# Patient Record
Sex: MALE | Race: WHITE | NOT HISPANIC OR LATINO
[De-identification: names, ages, dates, MRNs, and addresses within clinical notes are randomized per-mention and may not be internally consistent; named-entity substitution may affect disease eponyms.]

---

## 2021-05-26 PROBLEM — Z00.00 ENCOUNTER FOR PREVENTIVE HEALTH EXAMINATION: Status: ACTIVE | Noted: 2021-05-26

## 2021-05-28 ENCOUNTER — APPOINTMENT (OUTPATIENT)
Dept: UROLOGY | Facility: CLINIC | Age: 86
End: 2021-05-28
Payer: MEDICARE

## 2021-05-28 VITALS
HEART RATE: 101 BPM | DIASTOLIC BLOOD PRESSURE: 75 MMHG | WEIGHT: 165 LBS | TEMPERATURE: 97.6 F | SYSTOLIC BLOOD PRESSURE: 146 MMHG | BODY MASS INDEX: 25.01 KG/M2 | HEIGHT: 68 IN

## 2021-05-28 DIAGNOSIS — Z86.73 PERSONAL HISTORY OF TRANSIENT ISCHEMIC ATTACK (TIA), AND CEREBRAL INFARCTION W/OUT RESIDUAL DEFICITS: ICD-10-CM

## 2021-05-28 DIAGNOSIS — Z86.79 PERSONAL HISTORY OF OTHER DISEASES OF THE CIRCULATORY SYSTEM: ICD-10-CM

## 2021-05-28 DIAGNOSIS — Z78.9 OTHER SPECIFIED HEALTH STATUS: ICD-10-CM

## 2021-05-28 PROCEDURE — 51798 US URINE CAPACITY MEASURE: CPT

## 2021-05-28 PROCEDURE — 99204 OFFICE O/P NEW MOD 45 MIN: CPT

## 2021-05-28 RX ORDER — ENALAPRIL MALEATE 10 MG/1
10 TABLET ORAL
Refills: 0 | Status: ACTIVE | COMMUNITY

## 2021-05-28 RX ORDER — ATORVASTATIN CALCIUM 40 MG/1
40 TABLET, FILM COATED ORAL
Refills: 0 | Status: ACTIVE | COMMUNITY

## 2021-05-28 NOTE — LETTER BODY
[Dear  ___] : Dear  [unfilled], [Consult Letter:] : I had the pleasure of evaluating your patient, [unfilled]. [Please see my note below.] : Please see my note below. [Consult Closing:] : Thank you very much for allowing me to participate in the care of this patient.  If you have any questions, please do not hesitate to contact me. [Sincerely,] : Sincerely, [FreeTextEntry3] : William

## 2021-05-28 NOTE — PHYSICAL EXAM
[General Appearance - Well Developed] : well developed [General Appearance - Well Nourished] : well nourished [Well Groomed] : well groomed [Normal Appearance] : normal appearance [General Appearance - In No Acute Distress] : no acute distress [Edema] : no peripheral edema [Respiration, Rhythm And Depth] : normal respiratory rhythm and effort [Exaggerated Use Of Accessory Muscles For Inspiration] : no accessory muscle use [Abdomen Soft] : soft [Abdomen Tenderness] : non-tender [Costovertebral Angle Tenderness] : no ~M costovertebral angle tenderness [Urethral Meatus] : meatus normal [Penis Abnormality] : normal uncircumcised penis [Urinary Bladder Findings] : the bladder was normal on palpation [Scrotum] : the scrotum was normal [Testes Mass (___cm)] : there were no testicular masses [Prostate Tenderness] : the prostate was not tender [No Prostate Nodules] : no prostate nodules [Prostate Size ___ (0-4)] : prostate size [unfilled] (scale: 0-4) [Normal Station and Gait] : the gait and station were normal for the patient's age [] : no rash [No Focal Deficits] : no focal deficits [Oriented To Time, Place, And Person] : oriented to person, place, and time [Affect] : the affect was normal [Mood] : the mood was normal [Not Anxious] : not anxious [No Palpable Adenopathy] : no palpable adenopathy

## 2021-05-28 NOTE — HISTORY OF PRESENT ILLNESS
[FreeTextEntry1] : This is a 89-year-old  male with a 5-month history of painless, third, gross hematuria.  The patient states he passed to "lori" in January.  He denies dysuria, suprapubic or flank pain or fever.  Patient denies history of kidney stones and does not smoke.  He states that he is voiding with an adequate stream and has nocturia x2.  The patient states he had a renal ultrasound which revealed a "lesion" on one of his kidneys.

## 2021-05-28 NOTE — ASSESSMENT
[FreeTextEntry1] : Thus the patient has several months history of painless gross hematuria and passage of likely small bladder calculi.  Patient on examination has a large prostate and significant postvoid residual (160 mL).  We will send the patient's urine for analysis, culture and cytology and he will have a basic metabolic test.  He will be placed on tamsulosin, 0.4 mg daily, to help with bladder emptying.  The patient will be scheduled for a pre and postcontrast CT scan of the abdomen and pelvis for better assessment of his upper tract and he will come back in 2 weeks for cystoscopic evaluation.

## 2021-05-28 NOTE — REVIEW OF SYSTEMS
[Negative] : Heme/Lymph [Blood in urine that you can see] : blood visible in urine [Wake up at night to urinate  How many times?  ___] : wakes up to urinate [unfilled] times during the night [Incontinence] : incontinence [Nocturia] : nocturia [Urine Infection (bladder/kidney)] : denies bladder/kidney infections [Pain during urination] : denies pain during urination [Pain at onset of urination] : denies pain during onset of urination [Pain after urination] : denies pain after urination [Told you have blood in urine on a urine test] : denies being told that blood was present in a urine test [Discharge from urine canal] : denies discharge from urine canal [History of kidney stones] : denies history of kidney stones [Urine retention] : denies urine retention [Strong urge to urinate] : denies strong urge to urinate [Bladder pressure] : denies bladder pressure [Strain or push to urinate] : denies straining or pushing to urinate [Wait a long time to urinate] : denies waiting a long time to urinate [Slow urine stream] : denies slow urine stream [Interrupted urine stream] : denies interrupted urine stream [Bladder fullness after urinating] : denies bladder fullness after urinating [Increased pain/discomfort with bladder filling] : denies increased pain/discomfort with bladder filling [Bladder problems as child. If yes, describe..] : denies bladder problems as child [Leakage of urine with straining, coughing, laughing] : denies leakage of urine with straining, coughing, and/or laughing [Unaware of when urine is leaking] : aware of when urine is leaking

## 2021-06-02 DIAGNOSIS — Z87.440 PERSONAL HISTORY OF URINARY (TRACT) INFECTIONS: ICD-10-CM

## 2021-06-09 ENCOUNTER — NON-APPOINTMENT (OUTPATIENT)
Age: 86
End: 2021-06-09

## 2021-06-14 ENCOUNTER — APPOINTMENT (OUTPATIENT)
Dept: UROLOGY | Facility: CLINIC | Age: 86
End: 2021-06-14
Payer: MEDICARE

## 2021-06-14 VITALS — TEMPERATURE: 98.1 F | DIASTOLIC BLOOD PRESSURE: 60 MMHG | SYSTOLIC BLOOD PRESSURE: 161 MMHG | HEART RATE: 114 BPM

## 2021-06-14 DIAGNOSIS — R31.0 GROSS HEMATURIA: ICD-10-CM

## 2021-06-14 DIAGNOSIS — D49.4 NEOPLASM OF UNSPECIFIED BEHAVIOR OF BLADDER: ICD-10-CM

## 2021-06-14 PROCEDURE — 52000 CYSTOURETHROSCOPY: CPT

## 2021-06-14 PROCEDURE — 99214 OFFICE O/P EST MOD 30 MIN: CPT | Mod: 25

## 2021-06-14 NOTE — HISTORY OF PRESENT ILLNESS
[FreeTextEntry1] : Patient is describing persistent hematuria, although he states that urine has not been as dark.  He denies dysuria suprapubic or flank pain or fever.  CT scan revealed 3.2 cm exophytic mass in the left kidney and a upper pole right renal mass, likely cyst.  In addition there is a large bladder mass measuring approximately 3.6 cm.. He presents for cystoscopy

## 2021-06-14 NOTE — LETTER BODY
[Dear  ___] : Dear  [unfilled], [Courtesy Letter:] : I had the pleasure of seeing your patient, [unfilled], in my office today. [Please see my note below.] : Please see my note below. [Consult Closing:] : Thank you very much for allowing me to participate in the care of this patient.  If you have any questions, please do not hesitate to contact me. [Sincerely,] : Sincerely, [FreeTextEntry3] : William [DrDell  ___] : Dr. STRAUSS

## 2021-06-14 NOTE — ASSESSMENT
[FreeTextEntry1] : The patient has persistent hematuria secondary to large bladder mass.  He will be seeing his cardiologist today for medical and cardiac evaluation and clearance and we will schedule him for transurethral resection of his bladder tumor in the near future.  We will treat his renal mass conservatively for now.

## 2021-06-14 NOTE — REVIEW OF SYSTEMS
[Incontinence] : incontinence [Nocturia] : nocturia [Blood in urine that you can see] : blood visible in urine [Wake up at night to urinate  How many times?  ___] : wakes up to urinate [unfilled] times during the night [Negative] : Heme/Lymph [Urine Infection (bladder/kidney)] : denies bladder/kidney infections [Pain during urination] : denies pain during urination [Pain at onset of urination] : denies pain during onset of urination [Pain after urination] : denies pain after urination [Told you have blood in urine on a urine test] : denies being told that blood was present in a urine test [Discharge from urine canal] : denies discharge from urine canal [History of kidney stones] : denies history of kidney stones [Urine retention] : denies urine retention [Strong urge to urinate] : denies strong urge to urinate [Bladder pressure] : denies bladder pressure [Strain or push to urinate] : denies straining or pushing to urinate [Wait a long time to urinate] : denies waiting a long time to urinate [Slow urine stream] : denies slow urine stream [Interrupted urine stream] : denies interrupted urine stream [Bladder fullness after urinating] : denies bladder fullness after urinating [Increased pain/discomfort with bladder filling] : denies increased pain/discomfort with bladder filling [Bladder problems as child. If yes, describe..] : denies bladder problems as child [Leakage of urine with straining, coughing, laughing] : denies leakage of urine with straining, coughing, and/or laughing [Unaware of when urine is leaking] : aware of when urine is leaking

## 2021-06-24 ENCOUNTER — INPATIENT (INPATIENT)
Facility: HOSPITAL | Age: 86
LOS: 1 days | Discharge: HOME | DRG: 668 | End: 2021-06-26
Attending: UROLOGY | Admitting: UROLOGY
Payer: MEDICARE

## 2021-06-24 ENCOUNTER — TRANSCRIPTION ENCOUNTER (OUTPATIENT)
Age: 86
End: 2021-06-24

## 2021-06-24 VITALS
HEART RATE: 86 BPM | OXYGEN SATURATION: 95 % | RESPIRATION RATE: 17 BRPM | SYSTOLIC BLOOD PRESSURE: 155 MMHG | DIASTOLIC BLOOD PRESSURE: 76 MMHG | WEIGHT: 179.68 LBS | TEMPERATURE: 98 F

## 2021-06-24 DIAGNOSIS — T41.205A ADVERSE EFFECT OF UNSPECIFIED GENERAL ANESTHETICS, INITIAL ENCOUNTER: ICD-10-CM

## 2021-06-24 DIAGNOSIS — T80.89XA OTHER COMPLICATIONS FOLLOWING INFUSION, TRANSFUSION AND THERAPEUTIC INJECTION, INITIAL ENCOUNTER: ICD-10-CM

## 2021-06-24 DIAGNOSIS — I69.352 HEMIPLEGIA AND HEMIPARESIS FOLLOWING CEREBRAL INFARCTION AFFECTING LEFT DOMINANT SIDE: ICD-10-CM

## 2021-06-24 DIAGNOSIS — Z87.438 PERSONAL HISTORY OF OTHER DISEASES OF MALE GENITAL ORGANS: Chronic | ICD-10-CM

## 2021-06-24 DIAGNOSIS — I10 ESSENTIAL (PRIMARY) HYPERTENSION: Chronic | ICD-10-CM

## 2021-06-24 DIAGNOSIS — Z79.02 LONG TERM (CURRENT) USE OF ANTITHROMBOTICS/ANTIPLATELETS: ICD-10-CM

## 2021-06-24 DIAGNOSIS — I69.359 HEMIPLEGIA AND HEMIPARESIS FOLLOWING CEREBRAL INFARCTION AFFECTING UNSPECIFIED SIDE: Chronic | ICD-10-CM

## 2021-06-24 DIAGNOSIS — D50.0 IRON DEFICIENCY ANEMIA SECONDARY TO BLOOD LOSS (CHRONIC): Chronic | ICD-10-CM

## 2021-06-24 DIAGNOSIS — D49.4 NEOPLASM OF UNSPECIFIED BEHAVIOR OF BLADDER: ICD-10-CM

## 2021-06-24 DIAGNOSIS — G92 TOXIC ENCEPHALOPATHY: ICD-10-CM

## 2021-06-24 LAB
ALBUMIN SERPL ELPH-MCNC: 3.7 G/DL — SIGNIFICANT CHANGE UP (ref 3.3–5)
ALP SERPL-CCNC: 77 U/L — SIGNIFICANT CHANGE UP (ref 40–120)
ALT FLD-CCNC: 12 U/L — SIGNIFICANT CHANGE UP (ref 10–45)
ANION GAP SERPL CALC-SCNC: 11 MMOL/L — SIGNIFICANT CHANGE UP (ref 5–17)
APPEARANCE UR: ABNORMAL
APTT BLD: 28.8 SEC — SIGNIFICANT CHANGE UP (ref 27.5–35.5)
AST SERPL-CCNC: 15 U/L — SIGNIFICANT CHANGE UP (ref 10–40)
BACTERIA # UR AUTO: PRESENT /HPF
BILIRUB SERPL-MCNC: 0.4 MG/DL — SIGNIFICANT CHANGE UP (ref 0.2–1.2)
BILIRUB UR-MCNC: NEGATIVE — SIGNIFICANT CHANGE UP
BLD GP AB SCN SERPL QL: NEGATIVE — SIGNIFICANT CHANGE UP
BLD GP AB SCN SERPL QL: NEGATIVE — SIGNIFICANT CHANGE UP
BUN SERPL-MCNC: 19 MG/DL — SIGNIFICANT CHANGE UP (ref 7–23)
CALCIUM SERPL-MCNC: 9 MG/DL — SIGNIFICANT CHANGE UP (ref 8.4–10.5)
CHLORIDE SERPL-SCNC: 100 MMOL/L — SIGNIFICANT CHANGE UP (ref 96–108)
CO2 SERPL-SCNC: 24 MMOL/L — SIGNIFICANT CHANGE UP (ref 22–31)
COLOR SPEC: YELLOW — SIGNIFICANT CHANGE UP
CREAT SERPL-MCNC: 0.95 MG/DL — SIGNIFICANT CHANGE UP (ref 0.5–1.3)
DIFF PNL FLD: ABNORMAL
EPI CELLS # UR: SIGNIFICANT CHANGE UP /HPF (ref 0–5)
GLUCOSE SERPL-MCNC: 105 MG/DL — HIGH (ref 70–99)
GLUCOSE UR QL: NEGATIVE — SIGNIFICANT CHANGE UP
HCT VFR BLD CALC: 25.3 % — LOW (ref 39–50)
HGB BLD-MCNC: 7.6 G/DL — LOW (ref 13–17)
INR BLD: 1.21 — HIGH (ref 0.88–1.16)
KETONES UR-MCNC: NEGATIVE — SIGNIFICANT CHANGE UP
LEUKOCYTE ESTERASE UR-ACNC: ABNORMAL
MCHC RBC-ENTMCNC: 24.8 PG — LOW (ref 27–34)
MCHC RBC-ENTMCNC: 30 GM/DL — LOW (ref 32–36)
MCV RBC AUTO: 82.4 FL — SIGNIFICANT CHANGE UP (ref 80–100)
NITRITE UR-MCNC: NEGATIVE — SIGNIFICANT CHANGE UP
NRBC # BLD: 0 /100 WBCS — SIGNIFICANT CHANGE UP (ref 0–0)
PH UR: 7.5 — SIGNIFICANT CHANGE UP (ref 5–8)
PLATELET # BLD AUTO: 263 K/UL — SIGNIFICANT CHANGE UP (ref 150–400)
POTASSIUM SERPL-MCNC: 4.1 MMOL/L — SIGNIFICANT CHANGE UP (ref 3.5–5.3)
POTASSIUM SERPL-SCNC: 4.1 MMOL/L — SIGNIFICANT CHANGE UP (ref 3.5–5.3)
PROT SERPL-MCNC: 6.6 G/DL — SIGNIFICANT CHANGE UP (ref 6–8.3)
PROT UR-MCNC: 30 MG/DL
PROTHROM AB SERPL-ACNC: 14.4 SEC — HIGH (ref 10.6–13.6)
RBC # BLD: 3.07 M/UL — LOW (ref 4.2–5.8)
RBC # FLD: 16.6 % — HIGH (ref 10.3–14.5)
RBC CASTS # UR COMP ASSIST: ABNORMAL /HPF
RH IG SCN BLD-IMP: POSITIVE — SIGNIFICANT CHANGE UP
RH IG SCN BLD-IMP: POSITIVE — SIGNIFICANT CHANGE UP
SODIUM SERPL-SCNC: 135 MMOL/L — SIGNIFICANT CHANGE UP (ref 135–145)
SP GR SPEC: 1.01 — SIGNIFICANT CHANGE UP (ref 1–1.03)
UROBILINOGEN FLD QL: 0.2 E.U./DL — SIGNIFICANT CHANGE UP
WBC # BLD: 7.05 K/UL — SIGNIFICANT CHANGE UP (ref 3.8–10.5)
WBC # FLD AUTO: 7.05 K/UL — SIGNIFICANT CHANGE UP (ref 3.8–10.5)
WBC UR QL: < 5 /HPF — SIGNIFICANT CHANGE UP

## 2021-06-24 PROCEDURE — 71045 X-RAY EXAM CHEST 1 VIEW: CPT | Mod: 26

## 2021-06-24 RX ORDER — SODIUM CHLORIDE 9 MG/ML
1000 INJECTION INTRAMUSCULAR; INTRAVENOUS; SUBCUTANEOUS
Refills: 0 | Status: DISCONTINUED | OUTPATIENT
Start: 2021-06-24 | End: 2021-06-24

## 2021-06-24 RX ORDER — ACETAMINOPHEN 500 MG
650 TABLET ORAL EVERY 6 HOURS
Refills: 0 | Status: DISCONTINUED | OUTPATIENT
Start: 2021-06-24 | End: 2021-06-25

## 2021-06-24 RX ORDER — AMLODIPINE BESYLATE 2.5 MG/1
5 TABLET ORAL DAILY
Refills: 0 | Status: DISCONTINUED | OUTPATIENT
Start: 2021-06-24 | End: 2021-06-26

## 2021-06-24 RX ORDER — TAMSULOSIN HYDROCHLORIDE 0.4 MG/1
0.4 CAPSULE ORAL AT BEDTIME
Refills: 0 | Status: DISCONTINUED | OUTPATIENT
Start: 2021-06-24 | End: 2021-06-26

## 2021-06-24 RX ORDER — ATORVASTATIN CALCIUM 80 MG/1
20 TABLET, FILM COATED ORAL AT BEDTIME
Refills: 0 | Status: DISCONTINUED | OUTPATIENT
Start: 2021-06-24 | End: 2021-06-26

## 2021-06-24 RX ADMIN — TAMSULOSIN HYDROCHLORIDE 0.4 MILLIGRAM(S): 0.4 CAPSULE ORAL at 21:43

## 2021-06-24 RX ADMIN — ATORVASTATIN CALCIUM 20 MILLIGRAM(S): 80 TABLET, FILM COATED ORAL at 21:43

## 2021-06-24 NOTE — H&P ADULT - NSHPLABSRESULTS_GEN_ALL_CORE
7.6    7.05  )-----------( 263      ( 24 Jun 2021 14:06 )             25.3   BMP pending  u/a pending

## 2021-06-24 NOTE — H&P ADULT - HISTORY OF PRESENT ILLNESS
89 yo male c/o hematuria on and off for 2 months. Patient had outpt cystoscopy which shows bladder tumor. Patient scheduled for TURBT tomorrow. +generalized weakness. No fever/chills. No abdominal pain. No N/V. No CP/SOB. Patient has been off his plavix about 2 weeks now.

## 2021-06-24 NOTE — H&P ADULT - NSHPPHYSICALEXAM_GEN_ALL_CORE
Gen: alert and awake  Abd: soft, NT, ND  : no SP discomfort. Uncircumcised phallus, bilat testes descended, NT,   EXT: no edema or calf tenderness

## 2021-06-24 NOTE — H&P ADULT - PROBLEM SELECTOR PLAN 1
-admit  -transfuse PRBC's  -f/u ekg and cxr  -f/u u/a  -NPO P MN for TURBT in am  -continue present care  -continue to hold plavix

## 2021-06-24 NOTE — H&P ADULT - NSICDXPASTSURGICALHX_GEN_ALL_CORE_FT
Detail Level: Detailed
PAST SURGICAL HISTORY:  Anemia due to chronic blood loss     Hemiplegia, dominant side S/P CVA (cerebrovascular accident)     History of BPH     HTN (hypertension)

## 2021-06-25 ENCOUNTER — APPOINTMENT (OUTPATIENT)
Dept: UROLOGY | Facility: HOSPITAL | Age: 86
End: 2021-06-25

## 2021-06-25 ENCOUNTER — RESULT REVIEW (OUTPATIENT)
Age: 86
End: 2021-06-25

## 2021-06-25 LAB
ANION GAP SERPL CALC-SCNC: 12 MMOL/L — SIGNIFICANT CHANGE UP (ref 5–17)
APPEARANCE UR: ABNORMAL
BACTERIA # UR AUTO: PRESENT /HPF
BASOPHILS # BLD AUTO: 0.06 K/UL — SIGNIFICANT CHANGE UP (ref 0–0.2)
BASOPHILS NFR BLD AUTO: 0.5 % — SIGNIFICANT CHANGE UP (ref 0–2)
BILIRUB UR-MCNC: NEGATIVE — SIGNIFICANT CHANGE UP
BUN SERPL-MCNC: 23 MG/DL — SIGNIFICANT CHANGE UP (ref 7–23)
CALCIUM SERPL-MCNC: 8.4 MG/DL — SIGNIFICANT CHANGE UP (ref 8.4–10.5)
CHLORIDE SERPL-SCNC: 103 MMOL/L — SIGNIFICANT CHANGE UP (ref 96–108)
CO2 SERPL-SCNC: 21 MMOL/L — LOW (ref 22–31)
COLOR SPEC: SIGNIFICANT CHANGE UP
CREAT SERPL-MCNC: 0.95 MG/DL — SIGNIFICANT CHANGE UP (ref 0.5–1.3)
DIFF PNL FLD: ABNORMAL
EOSINOPHIL # BLD AUTO: 0.04 K/UL — SIGNIFICANT CHANGE UP (ref 0–0.5)
EOSINOPHIL NFR BLD AUTO: 0.3 % — SIGNIFICANT CHANGE UP (ref 0–6)
EPI CELLS # UR: SIGNIFICANT CHANGE UP /HPF (ref 0–5)
GLUCOSE SERPL-MCNC: 173 MG/DL — HIGH (ref 70–99)
GLUCOSE UR QL: NEGATIVE — SIGNIFICANT CHANGE UP
HCT VFR BLD CALC: 30.9 % — LOW (ref 39–50)
HGB BLD-MCNC: 9.4 G/DL — LOW (ref 13–17)
IMM GRANULOCYTES NFR BLD AUTO: 0.9 % — SIGNIFICANT CHANGE UP (ref 0–1.5)
KETONES UR-MCNC: NEGATIVE — SIGNIFICANT CHANGE UP
LEUKOCYTE ESTERASE UR-ACNC: NEGATIVE — SIGNIFICANT CHANGE UP
LYMPHOCYTES # BLD AUTO: 0.52 K/UL — LOW (ref 1–3.3)
LYMPHOCYTES # BLD AUTO: 4.3 % — LOW (ref 13–44)
MCHC RBC-ENTMCNC: 25.7 PG — LOW (ref 27–34)
MCHC RBC-ENTMCNC: 30.4 GM/DL — LOW (ref 32–36)
MCV RBC AUTO: 84.4 FL — SIGNIFICANT CHANGE UP (ref 80–100)
MONOCYTES # BLD AUTO: 0.49 K/UL — SIGNIFICANT CHANGE UP (ref 0–0.9)
MONOCYTES NFR BLD AUTO: 4 % — SIGNIFICANT CHANGE UP (ref 2–14)
NEUTROPHILS # BLD AUTO: 10.93 K/UL — HIGH (ref 1.8–7.4)
NEUTROPHILS NFR BLD AUTO: 90 % — HIGH (ref 43–77)
NITRITE UR-MCNC: NEGATIVE — SIGNIFICANT CHANGE UP
NRBC # BLD: 0 /100 WBCS — SIGNIFICANT CHANGE UP (ref 0–0)
NT-PROBNP SERPL-SCNC: 566 PG/ML — HIGH (ref 0–300)
PH UR: 8 — SIGNIFICANT CHANGE UP (ref 5–8)
PLATELET # BLD AUTO: 234 K/UL — SIGNIFICANT CHANGE UP (ref 150–400)
POTASSIUM SERPL-MCNC: 4.1 MMOL/L — SIGNIFICANT CHANGE UP (ref 3.5–5.3)
POTASSIUM SERPL-SCNC: 4.1 MMOL/L — SIGNIFICANT CHANGE UP (ref 3.5–5.3)
PROT UR-MCNC: 30 MG/DL
RBC # BLD: 3.66 M/UL — LOW (ref 4.2–5.8)
RBC # FLD: 16.1 % — HIGH (ref 10.3–14.5)
RBC CASTS # UR COMP ASSIST: > 10 /HPF
SODIUM SERPL-SCNC: 136 MMOL/L — SIGNIFICANT CHANGE UP (ref 135–145)
SP GR SPEC: 1.01 — SIGNIFICANT CHANGE UP (ref 1–1.03)
TRI-PHOS CRY UR QL COMP ASSIST: ABNORMAL /HPF
TROPONIN T SERPL-MCNC: 0.01 NG/ML — SIGNIFICANT CHANGE UP (ref 0–0.01)
UROBILINOGEN FLD QL: 0.2 E.U./DL — SIGNIFICANT CHANGE UP
WBC # BLD: 12.15 K/UL — HIGH (ref 3.8–10.5)
WBC # FLD AUTO: 12.15 K/UL — HIGH (ref 3.8–10.5)
WBC UR QL: < 5 /HPF — SIGNIFICANT CHANGE UP

## 2021-06-25 PROCEDURE — 99223 1ST HOSP IP/OBS HIGH 75: CPT

## 2021-06-25 PROCEDURE — 71045 X-RAY EXAM CHEST 1 VIEW: CPT | Mod: 26

## 2021-06-25 PROCEDURE — 52240 CYSTOSCOPY AND TREATMENT: CPT

## 2021-06-25 PROCEDURE — 88307 TISSUE EXAM BY PATHOLOGIST: CPT | Mod: 26

## 2021-06-25 RX ORDER — ONDANSETRON 8 MG/1
4 TABLET, FILM COATED ORAL EVERY 8 HOURS
Refills: 0 | Status: DISCONTINUED | OUTPATIENT
Start: 2021-06-25 | End: 2021-06-26

## 2021-06-25 RX ORDER — KETOROLAC TROMETHAMINE 30 MG/ML
15 SYRINGE (ML) INJECTION EVERY 8 HOURS
Refills: 0 | Status: DISCONTINUED | OUTPATIENT
Start: 2021-06-25 | End: 2021-06-26

## 2021-06-25 RX ORDER — OXYCODONE HYDROCHLORIDE 5 MG/1
10 TABLET ORAL ONCE
Refills: 0 | Status: DISCONTINUED | OUTPATIENT
Start: 2021-06-25 | End: 2021-06-25

## 2021-06-25 RX ORDER — FUROSEMIDE 40 MG
20 TABLET ORAL ONCE
Refills: 0 | Status: COMPLETED | OUTPATIENT
Start: 2021-06-25 | End: 2021-06-25

## 2021-06-25 RX ORDER — CEFAZOLIN SODIUM 1 G
500 VIAL (EA) INJECTION EVERY 8 HOURS
Refills: 0 | Status: DISCONTINUED | OUTPATIENT
Start: 2021-06-25 | End: 2021-06-25

## 2021-06-25 RX ORDER — ACETAMINOPHEN 500 MG
650 TABLET ORAL EVERY 6 HOURS
Refills: 0 | Status: DISCONTINUED | OUTPATIENT
Start: 2021-06-25 | End: 2021-06-26

## 2021-06-25 RX ORDER — SODIUM CHLORIDE 9 MG/ML
1000 INJECTION INTRAMUSCULAR; INTRAVENOUS; SUBCUTANEOUS
Refills: 0 | Status: DISCONTINUED | OUTPATIENT
Start: 2021-06-25 | End: 2021-06-25

## 2021-06-25 RX ORDER — CEFAZOLIN SODIUM 1 G
1000 VIAL (EA) INJECTION EVERY 8 HOURS
Refills: 0 | Status: COMPLETED | OUTPATIENT
Start: 2021-06-25 | End: 2021-06-26

## 2021-06-25 RX ADMIN — TAMSULOSIN HYDROCHLORIDE 0.4 MILLIGRAM(S): 0.4 CAPSULE ORAL at 22:03

## 2021-06-25 RX ADMIN — Medication 100 MILLIGRAM(S): at 17:15

## 2021-06-25 RX ADMIN — Medication 650 MILLIGRAM(S): at 14:07

## 2021-06-25 RX ADMIN — Medication 100 MILLIGRAM(S): at 23:55

## 2021-06-25 RX ADMIN — Medication 20 MILLIGRAM(S): at 10:07

## 2021-06-25 RX ADMIN — OXYCODONE HYDROCHLORIDE 10 MILLIGRAM(S): 5 TABLET ORAL at 01:30

## 2021-06-25 RX ADMIN — Medication 15 MILLIGRAM(S): at 22:03

## 2021-06-25 RX ADMIN — Medication 650 MILLIGRAM(S): at 15:07

## 2021-06-25 RX ADMIN — ONDANSETRON 4 MILLIGRAM(S): 8 TABLET, FILM COATED ORAL at 09:10

## 2021-06-25 RX ADMIN — Medication 10 MILLIGRAM(S): at 17:15

## 2021-06-25 RX ADMIN — Medication 15 MILLIGRAM(S): at 15:01

## 2021-06-25 RX ADMIN — ATORVASTATIN CALCIUM 20 MILLIGRAM(S): 80 TABLET, FILM COATED ORAL at 22:04

## 2021-06-25 RX ADMIN — Medication 15 MILLIGRAM(S): at 23:00

## 2021-06-25 RX ADMIN — Medication 650 MILLIGRAM(S): at 19:48

## 2021-06-25 RX ADMIN — Medication 650 MILLIGRAM(S): at 20:40

## 2021-06-25 RX ADMIN — Medication 15 MILLIGRAM(S): at 14:07

## 2021-06-25 RX ADMIN — OXYCODONE HYDROCHLORIDE 10 MILLIGRAM(S): 5 TABLET ORAL at 02:00

## 2021-06-25 NOTE — CONSULT NOTE ADULT - ATTENDING COMMENTS
Seen by me this afternoon  Metabolic encephalopathy seems to have improved/resolved  Acute hypoxemic respiratory failure likely secondary to transfusion given yesterday (2 Units) and considering pt's age, received IV lasix today (20mg), reassess need for further IV lasix this evening if inadequate output  Encourage IS use  Wean off oxygen as tolerated  F/up BNP, 2nd set of cardiac enzymes  F/up anemia  PT evaluation  Rest as above

## 2021-06-25 NOTE — BRIEF OPERATIVE NOTE - NSICDXBRIEFPROCEDURE_GEN_ALL_CORE_FT
PROCEDURES:  Cystourethroscopy with bladder tumor resection, large 25-Jun-2021 09:06:36  Mayank Ruth

## 2021-06-25 NOTE — CONSULT NOTE ADULT - ASSESSMENT
Patient is a 89y old Male with PMHx HTN, HLD, meningioma (2.5cm), CVA (residual LUE weakness and numbness, left facial numbness), COVID in 4/2021 presents to urologic service on 6/24/21 for planned TURBT 6/26/21 in the setting of recent outpatient cystoscopy showed bladder tumor after c/o hematuria on and off for 2 months. Medicine consulted for hypoxia and AMS s/p TURBT.     #Acute Hypoxic Respiratory Failure: Patient with hypoxia to 87% on 6L NC post-op. CXR seems to be consistent with volume overload, EKG demonstrated NSR, RBBB (old). Outpatient echo and EKG reviewed. Outpatient echo 6/14/21 demonstrated normal LV size and function, diastolic dysfunction Grade 1, LVH, aortic stenosis with mildly reduced opening, mild to moderate MR, mild TR and mild Pulm HTN. Patient s/p 2uPRBC pre-op for anemia. Patient s/p IV Lasix 20mg x1 with subsequent improvement in O2 status to 97% on 3L NC. Seems most consistent with volume overload vs. TRALI  -continue to monitor respiratory status  -wean O2 as tolerated  -strict I&O monitoring  -consider additional IV Lasix 20mg tonight. goal Net negative -1L over 24hrs   -repeat CXR in the AM     #Metabolic Encephalopathy: RESOLVED. Patient apparently AAOx1 immediately post-op, now AAOX3 without new focal deficits. Likely encephalopathy 2/2 anesthesia.   -continue to monitor mental status  -if any new focal deficits or new AMS consider CTH noncontrast    Plan incomplete until discussed with Dr. Centeno   Medicine will continue to follow Patient is a 89y old Male with PMHx HTN, HLD, meningioma (2.5cm), CVA (residual LUE weakness and numbness, left facial numbness), COVID in 4/2021 presents to urologic service on 6/24/21 for planned TURBT 6/26/21 in the setting of recent outpatient cystoscopy showed bladder tumor after c/o hematuria on and off for 2 months. Medicine consulted for hypoxia and AMS s/p TURBT.     #Acute Hypoxic Respiratory Failure: Patient with hypoxia to 87% on 6L NC post-op. CXR seems to be consistent with volume overload, EKG demonstrated NSR, RBBB (old). Outpatient echo and EKG reviewed. Outpatient echo 6/14/21 demonstrated normal LV size and function, diastolic dysfunction Grade 1, LVH, aortic stenosis with mildly reduced opening, mild to moderate MR, mild TR and mild Pulm HTN. Patient s/p 2uPRBC pre-op for anemia. Patient s/p IV Lasix 20mg x1 with subsequent improvement in O2 status to 97% on 3L NC. Seems most consistent with volume overload vs. TRALI  -continue to monitor respiratory status  -wean O2 as tolerated  -strict I&O monitoring  -consider additional IV Lasix 20mg tonight. goal Net negative -1L over 24hrs   -trop negative x1, would repeat trop 6 hrs afterwards. If trending up continue trending q6hrs until peak  -check BNP, can add on to most recent BMP   -check BMP, magnesium, and phosphorus with next scheduled trop check while diuresing to prevent electrolyte deficiencies. Repeat to maintain K+ >4, Mg >2, Phos >2.5  -repeat CXR in the AM   -Physical therapy consult  -incentive spirometry    #Metabolic Encephalopathy: RESOLVED. Patient apparently AAOx1 immediately post-op, now AAOX3 without new focal deficits. Likely encephalopathy 2/2 anesthesia.   -continue to monitor mental status  -if any new focal deficits or new AMS consider CTH noncontrast    Plan discussed with Dr. Centeno   Medicine will continue to follow

## 2021-06-25 NOTE — CONSULT NOTE ADULT - SUBJECTIVE AND OBJECTIVE BOX
INCOMPLETE NOTE     ABDOULCHETANMILTONE  89y  Male      Patient is a 89y old  Male who presents with a chief complaint of hematuria, bladder tumor, anemia (25 Jun 2021 06:03)        PAST MEDICAL/SURGICAL HISTORY  PAST MEDICAL & SURGICAL HISTORY:  HTN (hypertension)    History of BPH    Hemiplegia, dominant side S/P CVA (cerebrovascular accident)    Anemia due to chronic blood loss        REVIEW OF SYSTEMS:  CONSTITUTIONAL: No fever, weight loss, or fatigue  EYES: No eye pain, visual disturbances, or discharge  ENMT:  No difficulty hearing, tinnitus, vertigo; No sinus or throat pain  NECK: No pain or stiffness  BREASTS: No pain, masses, or nipple discharge  RESPIRATORY: No cough, wheezing, chills or hemoptysis; No shortness of breath  CARDIOVASCULAR: No chest pain, palpitations, dizziness, or leg swelling  GASTROINTESTINAL: No abdominal or epigastric pain. No nausea, vomiting, or hematemesis; No diarrhea or constipation. No melena or hematochezia.  GENITOURINARY: No dysuria, frequency, hematuria, or incontinence  NEUROLOGICAL: No headaches, memory loss, loss of strength, numbness, or tremors  SKIN: No itching, burning, rashes, or lesions   LYMPH NODES: No enlarged glands  ENDOCRINE: No heat or cold intolerance; No hair loss  MUSCULOSKELETAL: No joint pain or swelling; No muscle, back, or extremity pain  PSYCHIATRIC: No depression, anxiety, mood swings, or difficulty sleeping  HEME/LYMPH: No easy bruising, or bleeding gums  ALLERY AND IMMUNOLOGIC: No hives or eczema    T(C): 36.4 (06-25-21 @ 09:05), Max: 36.8 (06-24-21 @ 20:30)  HR: 64 (06-25-21 @ 11:06) (52 - 86)  BP: 146/76 (06-25-21 @ 11:06) (107/66 - 164/77)  RR: 13 (06-25-21 @ 10:35) (13 - 25)  SpO2: 97% (06-25-21 @ 11:06) (87% - 97%)  Wt(kg): --Vital Signs Last 24 Hrs  T(C): 36.4 (25 Jun 2021 09:05), Max: 36.8 (24 Jun 2021 20:30)  T(F): 97.6 (25 Jun 2021 09:05), Max: 98.2 (24 Jun 2021 20:30)  HR: 64 (25 Jun 2021 11:06) (52 - 86)  BP: 146/76 (25 Jun 2021 11:06) (107/66 - 164/77)  BP(mean): 86 (25 Jun 2021 10:35) (76 - 89)  RR: 13 (25 Jun 2021 10:35) (13 - 25)  SpO2: 97% (25 Jun 2021 11:06) (87% - 97%)    PHYSICAL EXAM:  GENERAL: NAD, well-groomed, well-developed  HEAD:  Atraumatic, Normocephalic  EYES: EOMI, PERRLA, conjunctiva and sclera clear  ENMT: No tonsillar erythema, exudates, or enlargement; Moist mucous membranes, Good dentition, No lesions  NECK: Supple, No JVD, Normal thyroid  NERVOUS SYSTEM:  Alert & Oriented X3, Good concentration; Motor Strength 5/5 B/L upper and lower extremities; DTRs 2+ intact and symmetric  CHEST/LUNG: Clear to percussion bilaterally; No rales, rhonchi, wheezing, or rubs  HEART: Regular rate and rhythm; No murmurs, rubs, or gallops  ABDOMEN: Soft, Nontender, Nondistended; Bowel sounds present  EXTREMITIES:  2+ Peripheral Pulses, No clubbing, cyanosis, or edema  LYMPH: No lymphadenopathy noted  SKIN: No rashes or lesions    Consultant(s) Notes Reviewed:  [x ] YES  [ ] NO  Care Discussed with Consultants/Other Providers [ x] YES  [ ] NO    LABS:  CBC   06-25-21 @ 10:39  Hematcorit 30.9  Hemoglobin 9.4  Mean Cell Hemoglobin 25.7  Platelet Count-Automated 234  RBC Count 3.66  Red Cell Distrib Width 16.1  Wbc Count 12.15  06-24-21 @ 14:06  Hematcorit 25.3  Hemoglobin 7.6  Mean Cell Hemoglobin 24.8  Platelet Count-Automated 263  RBC Count 3.07  Red Cell Distrib Width 16.6  Wbc Count 7.05      BMP  06-25-21 @ 10:39  Anion Gap. Serum 12  Blood Urea Nitrogen,Serm 23  Calcium, Total Serum 8.4  Carbon Dioxide, Serum 21  Chloride, Serum 103  Creatinine, Serum 0.95  eGFR in  79  eGFR in Non Afican American 68  Gloucose, serum 173  Potassium, Serum 4.1  Sodium, Serum 136              06-24-21 @ 14:06  Anion Gap. Serum 11  Blood Urea Nitrogen,Serm 19  Calcium, Total Serum 9.0  Carbon Dioxide, Serum 24  Chloride, Serum 100  Creatinine, Serum 0.95  eGFR in  79  eGFR in Non Afican American 68  Gloucose, serum 105  Potassium, Serum 4.1  Sodium, Serum 135                  CMP  06-25-21 @ 10:39  Pam Aminotransferase(ALT/SGPT)--  Albumin, Serum --  Alkaline Phosphatase, Serum --  Anion Gap, Serum 12  Aspartate Aminotransferase (AST/SGOT)--  Bilirubin Total, Serum --  Blood Urea Nitrogen, Serum 23  Calcium,Total Serum 8.4  Carbon Dioxide, Serum 21  Chloride, Serum 103  Creatinine, Serum 0.95  eGFR if  79  eGFR if Non African American 68  Glucose, Serum 173  Potassium, Serum 4.1  Protein Total, Serum --  Sodium, Serum 136                      06-24-21 @ 14:06  Pam Aminotransferase(ALT/SGPT)12  Albumin, Serum 3.7  Alkaline Phosphatase, Serum 77  Anion Gap, Serum 11  Aspartate Aminotransferase (AST/SGOT)15  Bilirubin Total, Serum 0.4  Blood Urea Nitrogen, Serum 19  Calcium,Total Serum 9.0  Carbon Dioxide, Serum 24  Chloride, Serum 100  Creatinine, Serum 0.95  eGFR if  79  eGFR if Non African American 68  Glucose, Serum 105  Potassium, Serum 4.1  Protein Total, Serum 6.6  Sodium, Serum 135                          PT/INR  PT/INR  06-24-21 @ 14:06  INR 1.21  Prothrombin Time Comment --  Prothrobin Time, [id number]      Amylase/Lipase            RADIOLOGY & ADDITIONAL TESTS:    Imaging Personally Reviewed:  [ ] YES  [ ] NO INCOMPLETE NOTE     DILLAN RUSS  89y  Male      Patient is a 89y old Male with PMHx HTN, HLD, meningioma (2.5cm), CVA (residual LUE weakness and numbness, left facial numbness), COVID in 4/2021 presents to urologic service on 6/24/21 for planned TURBT 6/26/21 in the setting of recent outpatient cystoscopy showed bladder tumor after c/o hematuria on and off for 2 months. Medicine consulted for hypoxia and AMS s/p TURBT.     Patient reports for past 5 days he has been somnolent, sleeping more than usual but otherwise feeling well. Also admitted to dry cough, urinary retention since Wednesday, and abdominal pain/chest "annoying" discomfort last night. Pain was described as intermittent, 1/10, non-radiating discomfort that lasted all day yesterday, now resolved. Also had nausea post-op, no longer present s/p Zofran. Denies fevers, chills, night sweats, rhinorrhea, sore throat, SOB, LANG, chest pain with movements, orthopnea, current abdominal pain, back pain, vomiting, diarrhea, constipation, melena, BRBPR, hematochezia, LE swelling, dysuria.        PAST MEDICAL/SURGICAL HISTORY  PAST MEDICAL & SURGICAL HISTORY:  HTN (hypertension)    History of BPH    Hemiplegia, dominant side S/P CVA (cerebrovascular accident)    Anemia due to chronic blood loss      MEDICATIONS:  Lasix 20mg daily  Flomax 0.4mg daily  Norvasc 5mg daily  Lipitor 20mg daily  Enalapril 10mg daily  Plavix 75mg daily    SOCIAL HISTORY  Denies tobacco, alcohol, illicit drugs    FAMILY HISTORY  Father: ?Cancer  Mother: ?Cancer    REVIEW OF SYSTEMS:  As per HPI    T(C): 36.4 (06-25-21 @ 09:05), Max: 36.8 (06-24-21 @ 20:30)  HR: 64 (06-25-21 @ 11:06) (52 - 86)  BP: 146/76 (06-25-21 @ 11:06) (107/66 - 164/77)  RR: 13 (06-25-21 @ 10:35) (13 - 25)  SpO2: 97% (06-25-21 @ 11:06) (87% - 97%)  Wt(kg): --Vital Signs Last 24 Hrs  T(C): 36.4 (25 Jun 2021 09:05), Max: 36.8 (24 Jun 2021 20:30)  T(F): 97.6 (25 Jun 2021 09:05), Max: 98.2 (24 Jun 2021 20:30)  HR: 64 (25 Jun 2021 11:06) (52 - 86)  BP: 146/76 (25 Jun 2021 11:06) (107/66 - 164/77)  BP(mean): 86 (25 Jun 2021 10:35) (76 - 89)  RR: 13 (25 Jun 2021 10:35) (13 - 25)  SpO2: 97% (25 Jun 2021 11:06) (87% - 97%)    PHYSICAL EXAM:  GENERAL: NAD, well-groomed, well-developed  HEAD:  Atraumatic, Normocephalic  EYES: EOMI, PERRLA, conjunctiva and sclera clear  ENMT: dry MM  NECK: Supple  CHEST/LUNG: faint b/l crackles in the bases.  HEART: Regular rate and rhythm; +s1/s2  ABDOMEN: Soft, Nontender, Nondistended; Bowel sounds present  EXTREMITIES:  2+ Peripheral Pulses, No clubbing, cyanosis, or edema  NERVOUS SYSTEM:  Alert & Oriented X3, Good concentration; Motor Strength 5/5 B/L upper and lower extremities with exception of LUE which is 4/5; CN II-XII intact with exception of numbness of the left V2, V3 distribution. Sensation decreased on left arm, otherwise b/l LE equal sensation.    Consultant(s) Notes Reviewed:  [x ] YES  [ ] NO  Care Discussed with Consultants/Other Providers [ x] YES  [ ] NO    LABS:  CBC   06-25-21 @ 10:39  Hematcorit 30.9  Hemoglobin 9.4  Mean Cell Hemoglobin 25.7  Platelet Count-Automated 234  RBC Count 3.66  Red Cell Distrib Width 16.1  Wbc Count 12.15  06-24-21 @ 14:06  Hematcorit 25.3  Hemoglobin 7.6  Mean Cell Hemoglobin 24.8  Platelet Count-Automated 263  RBC Count 3.07  Red Cell Distrib Width 16.6  Wbc Count 7.05      BMP  06-25-21 @ 10:39  Anion Gap. Serum 12  Blood Urea Nitrogen,Serm 23  Calcium, Total Serum 8.4  Carbon Dioxide, Serum 21  Chloride, Serum 103  Creatinine, Serum 0.95  eGFR in  79  eGFR in Non Afican American 68  Gloucose, serum 173  Potassium, Serum 4.1  Sodium, Serum 136              06-24-21 @ 14:06  Anion Gap. Serum 11  Blood Urea Nitrogen,Serm 19  Calcium, Total Serum 9.0  Carbon Dioxide, Serum 24  Chloride, Serum 100  Creatinine, Serum 0.95  eGFR in  79  eGFR in Non Afican American 68  Gloucose, serum 105  Potassium, Serum 4.1  Sodium, Serum 135                  CMP  06-25-21 @ 10:39  Pam Aminotransferase(ALT/SGPT)--  Albumin, Serum --  Alkaline Phosphatase, Serum --  Anion Gap, Serum 12  Aspartate Aminotransferase (AST/SGOT)--  Bilirubin Total, Serum --  Blood Urea Nitrogen, Serum 23  Calcium,Total Serum 8.4  Carbon Dioxide, Serum 21  Chloride, Serum 103  Creatinine, Serum 0.95  eGFR if  79  eGFR if Non African American 68  Glucose, Serum 173  Potassium, Serum 4.1  Protein Total, Serum --  Sodium, Serum 136                      06-24-21 @ 14:06  Pam Aminotransferase(ALT/SGPT)12  Albumin, Serum 3.7  Alkaline Phosphatase, Serum 77  Anion Gap, Serum 11  Aspartate Aminotransferase (AST/SGOT)15  Bilirubin Total, Serum 0.4  Blood Urea Nitrogen, Serum 19  Calcium,Total Serum 9.0  Carbon Dioxide, Serum 24  Chloride, Serum 100  Creatinine, Serum 0.95  eGFR if  79  eGFR if Non African American 68  Glucose, Serum 105  Potassium, Serum 4.1  Protein Total, Serum 6.6  Sodium, Serum 135                          PT/INR  PT/INR  06-24-21 @ 14:06  INR 1.21  Prothrombin Time Comment --  Prothrobin Time, [id number]      Amylase/Lipase            RADIOLOGY & ADDITIONAL TESTS:    Imaging Personally Reviewed:  [ ] YES  [ ] NO DILLAN RUSS  89y  Male      Patient is a 89y old Male with PMHx HTN, HLD, meningioma (2.5cm), CVA (residual LUE weakness and numbness, left facial numbness), COVID in 4/2021 presents to urologic service on 6/24/21 for planned TURBT 6/26/21 in the setting of recent outpatient cystoscopy showed bladder tumor after c/o hematuria on and off for 2 months. Medicine consulted for hypoxia and AMS s/p TURBT.     Patient reports for past 5 days he has been somnolent, sleeping more than usual but otherwise feeling well. Also admitted to dry cough, urinary retention since Wednesday, and abdominal pain/chest "annoying" discomfort last night. Pain was described as intermittent, 1/10, non-radiating discomfort that lasted all day yesterday, now resolved. Also had nausea post-op, no longer present s/p Zofran. Denies fevers, chills, night sweats, rhinorrhea, sore throat, SOB, LANG, chest pain with movements, orthopnea, current abdominal pain, back pain, vomiting, diarrhea, constipation, melena, BRBPR, hematochezia, LE swelling, dysuria.        PAST MEDICAL/SURGICAL HISTORY  PAST MEDICAL & SURGICAL HISTORY:  HTN (hypertension)    History of BPH    Hemiplegia, dominant side S/P CVA (cerebrovascular accident)    Anemia due to chronic blood loss      MEDICATIONS:  Lasix 20mg daily  Flomax 0.4mg daily  Norvasc 5mg daily  Lipitor 20mg daily  Enalapril 10mg daily  Plavix 75mg daily    SOCIAL HISTORY  Denies tobacco, alcohol, illicit drugs    FAMILY HISTORY  Father: ?Cancer  Mother: ?Cancer    REVIEW OF SYSTEMS:  As per HPI    T(C): 36.4 (06-25-21 @ 09:05), Max: 36.8 (06-24-21 @ 20:30)  HR: 64 (06-25-21 @ 11:06) (52 - 86)  BP: 146/76 (06-25-21 @ 11:06) (107/66 - 164/77)  RR: 13 (06-25-21 @ 10:35) (13 - 25)  SpO2: 97% (06-25-21 @ 11:06) (87% - 97%)  Wt(kg): --Vital Signs Last 24 Hrs  T(C): 36.4 (25 Jun 2021 09:05), Max: 36.8 (24 Jun 2021 20:30)  T(F): 97.6 (25 Jun 2021 09:05), Max: 98.2 (24 Jun 2021 20:30)  HR: 64 (25 Jun 2021 11:06) (52 - 86)  BP: 146/76 (25 Jun 2021 11:06) (107/66 - 164/77)  BP(mean): 86 (25 Jun 2021 10:35) (76 - 89)  RR: 13 (25 Jun 2021 10:35) (13 - 25)  SpO2: 97% (25 Jun 2021 11:06) (87% - 97%)    PHYSICAL EXAM:  GENERAL: NAD, well-groomed, well-developed  HEAD:  Atraumatic, Normocephalic  EYES: EOMI, PERRLA, conjunctiva and sclera clear  ENMT: dry MM  NECK: Supple  CHEST/LUNG: faint b/l crackles in the bases.  HEART: Regular rate and rhythm; +s1/s2  ABDOMEN: Soft, Nontender, Nondistended; Bowel sounds present  EXTREMITIES:  2+ Peripheral Pulses, No clubbing, cyanosis, or edema  NERVOUS SYSTEM:  Alert & Oriented X3, Good concentration; Motor Strength 5/5 B/L upper and lower extremities with exception of LUE which is 4/5; CN II-XII intact with exception of numbness of the left V2, V3 distribution. Sensation decreased on left arm, otherwise b/l LE equal sensation.    Consultant(s) Notes Reviewed:  [x ] YES  [ ] NO  Care Discussed with Consultants/Other Providers [ x] YES  [ ] NO    LABS:  CBC   06-25-21 @ 10:39  Hematcorit 30.9  Hemoglobin 9.4  Mean Cell Hemoglobin 25.7  Platelet Count-Automated 234  RBC Count 3.66  Red Cell Distrib Width 16.1  Wbc Count 12.15  06-24-21 @ 14:06  Hematcorit 25.3  Hemoglobin 7.6  Mean Cell Hemoglobin 24.8  Platelet Count-Automated 263  RBC Count 3.07  Red Cell Distrib Width 16.6  Wbc Count 7.05      BMP  06-25-21 @ 10:39  Anion Gap. Serum 12  Blood Urea Nitrogen,Serm 23  Calcium, Total Serum 8.4  Carbon Dioxide, Serum 21  Chloride, Serum 103  Creatinine, Serum 0.95  eGFR in  79  eGFR in Non Afican American 68  Gloucose, serum 173  Potassium, Serum 4.1  Sodium, Serum 136              06-24-21 @ 14:06  Anion Gap. Serum 11  Blood Urea Nitrogen,Serm 19  Calcium, Total Serum 9.0  Carbon Dioxide, Serum 24  Chloride, Serum 100  Creatinine, Serum 0.95  eGFR in  79  eGFR in Non Afican American 68  Gloucose, serum 105  Potassium, Serum 4.1  Sodium, Serum 135                  CMP  06-25-21 @ 10:39  Pam Aminotransferase(ALT/SGPT)--  Albumin, Serum --  Alkaline Phosphatase, Serum --  Anion Gap, Serum 12  Aspartate Aminotransferase (AST/SGOT)--  Bilirubin Total, Serum --  Blood Urea Nitrogen, Serum 23  Calcium,Total Serum 8.4  Carbon Dioxide, Serum 21  Chloride, Serum 103  Creatinine, Serum 0.95  eGFR if  79  eGFR if Non African American 68  Glucose, Serum 173  Potassium, Serum 4.1  Protein Total, Serum --  Sodium, Serum 136                      06-24-21 @ 14:06  Pam Aminotransferase(ALT/SGPT)12  Albumin, Serum 3.7  Alkaline Phosphatase, Serum 77  Anion Gap, Serum 11  Aspartate Aminotransferase (AST/SGOT)15  Bilirubin Total, Serum 0.4  Blood Urea Nitrogen, Serum 19  Calcium,Total Serum 9.0  Carbon Dioxide, Serum 24  Chloride, Serum 100  Creatinine, Serum 0.95  eGFR if  79  eGFR if Non African American 68  Glucose, Serum 105  Potassium, Serum 4.1  Protein Total, Serum 6.6  Sodium, Serum 135                          PT/INR  PT/INR  06-24-21 @ 14:06  INR 1.21  Prothrombin Time Comment --  Prothrobin Time, [id number]      Amylase/Lipase            RADIOLOGY & ADDITIONAL TESTS:    Imaging Personally Reviewed:  [ ] YES  [ ] NO

## 2021-06-25 NOTE — PROGRESS NOTE ADULT - SUBJECTIVE AND OBJECTIVE BOX
POST OP NOTE:  procedure: TURBT  Patient denies any acute complaints. In PACU patient desaturating to high 80s on nasal cannula. Anesthesia obtained CXR and ordered 20mg of lasix. Patient saturations improved. Patient denies shortness of breath, nausea, vomiting, fevers or chills       06-24-21 @ 07:01  -  06-25-21 @ 07:00  --------------------------------------------------------  IN: 1770 mL / OUT: 820 mL / NET: 950 mL    06-25-21 @ 07:01  -  06-25-21 @ 16:25  --------------------------------------------------------  IN: 80 mL / OUT: 500 mL / NET: -420 mL      T(C): 36.1 (06-25-21 @ 14:00), Max: 36.8 (06-24-21 @ 20:30)  HR: 71 (06-25-21 @ 14:00) (52 - 84)  BP: 150/67 (06-25-21 @ 14:00) (107/66 - 164/77)  RR: 16 (06-25-21 @ 14:00) (13 - 25)  SpO2: 96% (06-25-21 @ 14:00) (87% - 97%)    GEN: no acute distress. alert and oriented   ABD: Soft, ND, NT  : 3way lugo in place. 3rd port capped. urine red without any clots

## 2021-06-25 NOTE — PROGRESS NOTE ADULT - ASSESSMENT
Patient is a 89y old Male with PMHx HTN, HLD, meningioma (2.5cm), CVA (residual LUE weakness and numbness, left facial numbness), COVID in 4/2021 presents to urologic service on 6/24/21 for planned TURBT 6/26/21 in the setting of recent outpatient cystoscopy showed bladder tumor after c/o hematuria on and off for 2 months. Medicine consulted for hypoxia and AMS s/p TURBT.

## 2021-06-26 ENCOUNTER — TRANSCRIPTION ENCOUNTER (OUTPATIENT)
Age: 86
End: 2021-06-26

## 2021-06-26 VITALS
DIASTOLIC BLOOD PRESSURE: 58 MMHG | OXYGEN SATURATION: 97 % | SYSTOLIC BLOOD PRESSURE: 144 MMHG | TEMPERATURE: 98 F | RESPIRATION RATE: 17 BRPM | HEART RATE: 71 BPM

## 2021-06-26 DIAGNOSIS — R09.02 HYPOXEMIA: ICD-10-CM

## 2021-06-26 LAB
ANION GAP SERPL CALC-SCNC: 11 MMOL/L — SIGNIFICANT CHANGE UP (ref 5–17)
BUN SERPL-MCNC: 22 MG/DL — SIGNIFICANT CHANGE UP (ref 7–23)
CALCIUM SERPL-MCNC: 8.3 MG/DL — LOW (ref 8.4–10.5)
CHLORIDE SERPL-SCNC: 101 MMOL/L — SIGNIFICANT CHANGE UP (ref 96–108)
CO2 SERPL-SCNC: 23 MMOL/L — SIGNIFICANT CHANGE UP (ref 22–31)
CREAT SERPL-MCNC: 1.04 MG/DL — SIGNIFICANT CHANGE UP (ref 0.5–1.3)
CULTURE RESULTS: NO GROWTH — SIGNIFICANT CHANGE UP
GLUCOSE SERPL-MCNC: 93 MG/DL — SIGNIFICANT CHANGE UP (ref 70–99)
HCT VFR BLD CALC: 30 % — LOW (ref 39–50)
HGB BLD-MCNC: 9.2 G/DL — LOW (ref 13–17)
MAGNESIUM SERPL-MCNC: 2 MG/DL — SIGNIFICANT CHANGE UP (ref 1.6–2.6)
MCHC RBC-ENTMCNC: 26.1 PG — LOW (ref 27–34)
MCHC RBC-ENTMCNC: 30.7 GM/DL — LOW (ref 32–36)
MCV RBC AUTO: 85.2 FL — SIGNIFICANT CHANGE UP (ref 80–100)
NRBC # BLD: 0 /100 WBCS — SIGNIFICANT CHANGE UP (ref 0–0)
NT-PROBNP SERPL-SCNC: 608 PG/ML — HIGH (ref 0–300)
PHOSPHATE SERPL-MCNC: 4.1 MG/DL — SIGNIFICANT CHANGE UP (ref 2.5–4.5)
PLATELET # BLD AUTO: 219 K/UL — SIGNIFICANT CHANGE UP (ref 150–400)
POTASSIUM SERPL-MCNC: 4 MMOL/L — SIGNIFICANT CHANGE UP (ref 3.5–5.3)
POTASSIUM SERPL-SCNC: 4 MMOL/L — SIGNIFICANT CHANGE UP (ref 3.5–5.3)
RBC # BLD: 3.52 M/UL — LOW (ref 4.2–5.8)
RBC # FLD: 16.5 % — HIGH (ref 10.3–14.5)
SODIUM SERPL-SCNC: 135 MMOL/L — SIGNIFICANT CHANGE UP (ref 135–145)
SPECIMEN SOURCE: SIGNIFICANT CHANGE UP
WBC # BLD: 8.13 K/UL — SIGNIFICANT CHANGE UP (ref 3.8–10.5)
WBC # FLD AUTO: 8.13 K/UL — SIGNIFICANT CHANGE UP (ref 3.8–10.5)

## 2021-06-26 PROCEDURE — 86900 BLOOD TYPING SEROLOGIC ABO: CPT

## 2021-06-26 PROCEDURE — 80053 COMPREHEN METABOLIC PANEL: CPT

## 2021-06-26 PROCEDURE — 88307 TISSUE EXAM BY PATHOLOGIST: CPT

## 2021-06-26 PROCEDURE — P9016: CPT

## 2021-06-26 PROCEDURE — 85027 COMPLETE CBC AUTOMATED: CPT

## 2021-06-26 PROCEDURE — 83735 ASSAY OF MAGNESIUM: CPT

## 2021-06-26 PROCEDURE — 86850 RBC ANTIBODY SCREEN: CPT

## 2021-06-26 PROCEDURE — 36430 TRANSFUSION BLD/BLD COMPNT: CPT

## 2021-06-26 PROCEDURE — 84100 ASSAY OF PHOSPHORUS: CPT

## 2021-06-26 PROCEDURE — 99232 SBSQ HOSP IP/OBS MODERATE 35: CPT

## 2021-06-26 PROCEDURE — 71045 X-RAY EXAM CHEST 1 VIEW: CPT

## 2021-06-26 PROCEDURE — 86923 COMPATIBILITY TEST ELECTRIC: CPT

## 2021-06-26 PROCEDURE — 84484 ASSAY OF TROPONIN QUANT: CPT

## 2021-06-26 PROCEDURE — 81001 URINALYSIS AUTO W/SCOPE: CPT

## 2021-06-26 PROCEDURE — 83880 ASSAY OF NATRIURETIC PEPTIDE: CPT

## 2021-06-26 PROCEDURE — 85025 COMPLETE CBC W/AUTO DIFF WBC: CPT

## 2021-06-26 PROCEDURE — 87086 URINE CULTURE/COLONY COUNT: CPT

## 2021-06-26 PROCEDURE — 85610 PROTHROMBIN TIME: CPT

## 2021-06-26 PROCEDURE — 36415 COLL VENOUS BLD VENIPUNCTURE: CPT

## 2021-06-26 PROCEDURE — 71045 X-RAY EXAM CHEST 1 VIEW: CPT | Mod: 26

## 2021-06-26 PROCEDURE — 86901 BLOOD TYPING SEROLOGIC RH(D): CPT

## 2021-06-26 PROCEDURE — 80048 BASIC METABOLIC PNL TOTAL CA: CPT

## 2021-06-26 PROCEDURE — 85730 THROMBOPLASTIN TIME PARTIAL: CPT

## 2021-06-26 RX ORDER — AZTREONAM 2 G
1 VIAL (EA) INJECTION
Qty: 10 | Refills: 0
Start: 2021-06-26 | End: 2021-06-30

## 2021-06-26 RX ADMIN — Medication 650 MILLIGRAM(S): at 07:49

## 2021-06-26 RX ADMIN — Medication 15 MILLIGRAM(S): at 07:49

## 2021-06-26 RX ADMIN — Medication 100 MILLIGRAM(S): at 07:49

## 2021-06-26 RX ADMIN — Medication 10 MILLIGRAM(S): at 07:49

## 2021-06-26 RX ADMIN — Medication 10 MILLIGRAM(S): at 19:13

## 2021-06-26 RX ADMIN — Medication 15 MILLIGRAM(S): at 08:04

## 2021-06-26 RX ADMIN — AMLODIPINE BESYLATE 5 MILLIGRAM(S): 2.5 TABLET ORAL at 07:49

## 2021-06-26 RX ADMIN — Medication 650 MILLIGRAM(S): at 08:04

## 2021-06-26 NOTE — DISCHARGE NOTE PROVIDER - HOSPITAL COURSE
This is a 89 yo male that was found to have a bladder mass an on 6/25 he underwent an uncomplicated TURBT.  Post op he was found to have small bilateral pleural effusion so he was kept for monitoring and diuresis. His Spear was removed today but failed the trial of void, and a new catheters has been inserted. He is now ready for discharge.

## 2021-06-26 NOTE — DISCHARGE NOTE PROVIDER - CARE PROVIDER_API CALL
William Hyatt)  Urology  [address] [place] [address]  Phone: [phone]  Fax: [phone]  Follow Up Time:   
William Hyatt)  Urology  [address] [place] [address]  Phone: [phone]  Fax: [phone]  Follow Up Time:

## 2021-06-26 NOTE — DISCHARGE NOTE PROVIDER - NSDCMRMEDTOKEN_GEN_ALL_CORE_FT
amLODIPine 5 mg oral tablet: 1 tab(s) orally once a day  atorvastatin 20 mg oral tablet: 1 tab(s) orally once a day  Bactrim  mg-160 mg oral tablet: 1 tab(s) orally 2 times a day MDD:2   enalapril 10 mg oral tablet: 10 milligram(s) orally 2 times a day  Flomax 0.4 mg oral capsule: 1 cap(s) orally once a day  iron sulfate:   Plavix 75 mg oral tablet: 1 tab(s) orally once a day  Vitamin D3:   
amLODIPine 5 mg oral tablet: 1 tab(s) orally once a day  atorvastatin 20 mg oral tablet: 1 tab(s) orally once a day  enalapril 10 mg oral tablet: 10 milligram(s) orally 2 times a day  Flomax 0.4 mg oral capsule: 1 cap(s) orally once a day  iron sulfate:   Plavix 75 mg oral tablet: 1 tab(s) orally once a day  Vitamin D3:

## 2021-06-26 NOTE — PROGRESS NOTE ADULT - ASSESSMENT
Patient is a 89y old Male with PMHx HTN, HLD, meningioma (2.5cm), CVA (residual LUE weakness and numbness, left facial numbness), COVID in 4/2021 presents to urologic service on 6/24/21 for planned TURBT 6/26/21 in the setting of recent outpatient cystoscopy showed bladder tumor after c/o hematuria on and off for 2 months. Medicine consulted for hypoxia and AMS s/p TURBT currently stable.

## 2021-06-26 NOTE — PROGRESS NOTE ADULT - PROBLEM SELECTOR PLAN 2
-in the setting of fluid overload s/p transfusions  -improved with lasix  -f/u am CXR  -wean O2  -currently saturating 98% on 2L NC  -f/u med consult recs

## 2021-06-26 NOTE — PROGRESS NOTE ADULT - PROBLEM SELECTOR PLAN 1
-OR today 6/25 TURBT  -NPO, IV fluids  -f/u am labs  -continue to hold plavix
- stable s/p TURBT   - continue lugo   - monitor urine output and color   - trend CBC   - SCDs  - OOB/IS  - abx: Cefazolin
- stable s/p TURBT   - continue lugo   - monitor urine output and color   - trend CBC   - transfer to telemetry for monitoring   - SCDs  - OOB/IS  - abx: Cefazolin

## 2021-06-26 NOTE — DISCHARGE NOTE PROVIDER - NSDCCPCAREPLAN_GEN_ALL_CORE_FT
PRINCIPAL DISCHARGE DIAGNOSIS  Diagnosis: Bladder mass  Assessment and Plan of Treatment:       
PRINCIPAL DISCHARGE DIAGNOSIS  Diagnosis: Bladder mass  Assessment and Plan of Treatment:

## 2021-06-26 NOTE — PROGRESS NOTE ADULT - ASSESSMENT
Patient is a 89y old Male with PMHx HTN, HLD, meningioma (2.5cm), CVA (residual LUE weakness and numbness, left facial numbness), COVID in 4/2021 presents to urologic service on 6/24/21 for planned TURBT 6/26/21 in the setting of recent outpatient cystoscopy showed bladder tumor after c/o hematuria on and off for 2 months. Medicine consulted for hypoxia and AMS s/p TURBT.     #Acute Hypoxic Respiratory Failure: Patient with hypoxia to 87% on 6L NC post-op. CXR seems to be consistent with volume overload, EKG demonstrated NSR, RBBB (old). Outpatient echo and EKG reviewed. Outpatient echo 6/14/21 demonstrated normal LV size and function, diastolic dysfunction Grade 1, LVH, aortic stenosis with mildly reduced opening, mild to moderate MR, mild TR and mild Pulm HTN. Patient s/p 2uPRBC pre-op for anemia. Patient s/p IV Lasix 20mg x1 with subsequent improvement in O2 status to 97% on 3L NC. Seems most consistent with volume overload vs. TRALI  -continue to monitor respiratory status  -wean O2 as tolerated, appears to be 95% on RA  -strict I&O monitoring  -continue home PO Lasix regimen  -repeat CXR in AM still with signs of volume overload  -f/u physical therapy  -incentive spirometry    #Metabolic Encephalopathy: RESOLVED. Patient apparently AAOx1 immediately post-op, now AAOX3 without new focal deficits. Likely encephalopathy 2/2 anesthesia.   -continue to monitor mental status  -if any new focal deficits or new AMS consider CTH noncontrast    Plan incomplete until discussed with Dr. Concepcion   Medicine will continue to follow Patient is a 89y old Male with PMHx HTN, HLD, meningioma (2.5cm), CVA (residual LUE weakness and numbness, left facial numbness), COVID in 4/2021 presents to urologic service on 6/24/21 for planned TURBT 6/26/21 in the setting of recent outpatient cystoscopy showed bladder tumor after c/o hematuria on and off for 2 months. Medicine consulted for hypoxia and AMS s/p TURBT.     #Acute Hypoxic Respiratory Failure: Patient with hypoxia to 87% on 6L NC post-op. CXR seems to be consistent with volume overload, EKG demonstrated NSR, RBBB (old). Outpatient echo and EKG reviewed. Outpatient echo 6/14/21 demonstrated normal LV size and function, diastolic dysfunction Grade 1, LVH, aortic stenosis with mildly reduced opening, mild to moderate MR, mild TR and mild Pulm HTN. Patient s/p 2uPRBC pre-op for anemia. Patient s/p IV Lasix 20mg x1 with subsequent improvement in O2 status to 97% on 3L NC. Seems most consistent with volume overload vs. TRALI  -continue to monitor respiratory status  -wean O2 as tolerated, appears to be 95% on RA  -strict I&O monitoring  -continue home PO Lasix 20mg regimen  -repeat CXR in AM still with signs of volume overload  -f/u physical therapy  -incentive spirometry    #Metabolic Encephalopathy: RESOLVED. Patient apparently AAOx1 immediately post-op, now AAOX3 without new focal deficits. Likely encephalopathy 2/2 anesthesia.   -continue to monitor mental status  -if any new focal deficits or new AMS consider CTH noncontrast    Plan discussed with Dr. Concepcion   Medicine will continue to follow

## 2021-06-26 NOTE — PROGRESS NOTE ADULT - REASON FOR ADMISSION
hematuria, bladder tumor, anemia

## 2021-06-26 NOTE — DISCHARGE NOTE NURSING/CASE MANAGEMENT/SOCIAL WORK - PATIENT PORTAL LINK FT
You can access the FollowMyHealth Patient Portal offered by [facility] by registering at the following website: [email or web address]. By joining [employer]’s FollowMyHealth portal, you will also be able to view your health information using other applications (apps) compatible with our system.

## 2021-06-26 NOTE — DISCHARGE NOTE PROVIDER - NSDCFUADDAPPT_GEN_ALL_CORE_FT
Please follow up with Dr. Hyatt in 1 week.  Call the office to schedule your appointment.  No tub bathing or swimming until confirmation during your follow up appointment.  Ambulate as tolerated, but no heavy lifting (>10lbs) or strenuous exercise.  You may resume regular diet.  You should be urinating at least 3-4x per day.  Call the office if you experience increasing pain, abdominal pain, nausea, vomiting, or temperature >101.4F, or difficulty urinating.

## 2021-06-26 NOTE — PROGRESS NOTE ADULT - SUBJECTIVE AND OBJECTIVE BOX
INCOMPLETE NOTE    SUBJECTIVE / INTERVAL HPI: Patient seen and examined at bedside.     VITAL SIGNS:  Vital Signs Last 24 Hrs  T(C): 36.4 (2021 07:51), Max: 36.7 (2021 17:55)  T(F): 97.5 (2021 07:51), Max: 98 (2021 17:55)  HR: 63 (2021 07:51) (52 - 71)  BP: 126/74 (2021 07:51) (112/64 - 150/67)  BP(mean): 86 (2021 10:35) (84 - 86)  RR: 14 (2021 07:51) (13 - 21)  SpO2: 97% (2021 07:51) (88% - 98%)    PHYSICAL EXAM:    General: WDWN  HEENT: NC/AT; PERRL, anicteric sclera; MMM  Neck: supple  Cardiovascular: +S1/S2, RRR  Respiratory: CTA B/L; no W/R/R  Gastrointestinal: soft, NT/ND; +BSx4  Extremities: WWP; no edema, clubbing or cyanosis  Vascular: 2+ radial, DP/PT pulses B/L  Neurological: AAOx3; no focal deficits    MEDICATIONS:  MEDICATIONS  (STANDING):  acetaminophen   Tablet .. 650 milliGRAM(s) Oral every 6 hours  amLODIPine   Tablet 5 milliGRAM(s) Oral daily  atorvastatin 20 milliGRAM(s) Oral at bedtime  enalapril 10 milliGRAM(s) Oral two times a day  ketorolac   Injectable 15 milliGRAM(s) IV Push every 8 hours  tamsulosin 0.4 milliGRAM(s) Oral at bedtime    MEDICATIONS  (PRN):  ondansetron Injectable 4 milliGRAM(s) IV Push every 8 hours PRN Nausea and/or Vomiting      ALLERGIES:  Allergies    No Known Allergies    Intolerances        LABS:                        9.2    8.13  )-----------( 219      ( 2021 08:15 )             30.0     -    135  |  101  |  22  ----------------------------<  93  4.0   |  23  |  1.04    Ca    8.3<L>      2021 08:15  Phos  4.1     06-  Mg     2.0     -    TPro  6.6  /  Alb  3.7  /  TBili  0.4  /  DBili  x   /  AST  15  /  ALT  12  /  AlkPhos  77  06-24    PT/INR - ( 2021 14:06 )   PT: 14.4 sec;   INR: 1.21          PTT - ( 2021 14:06 )  PTT:28.8 sec  Urinalysis Basic - ( 2021 06:01 )    Color: Pink / Appearance: SL Cloudy / S.015 / pH: x  Gluc: x / Ketone: NEGATIVE  / Bili: Negative / Urobili: 0.2 E.U./dL   Blood: x / Protein: 30 mg/dL / Nitrite: NEGATIVE   Leuk Esterase: NEGATIVE / RBC: > 10 /HPF / WBC < 5 /HPF   Sq Epi: x / Non Sq Epi: 0-5 /HPF / Bacteria: Present /HPF      CAPILLARY BLOOD GLUCOSE          RADIOLOGY & ADDITIONAL TESTS: Reviewed. SUBJECTIVE / INTERVAL HPI: Patient seen and examined at bedside. Patient reports no BM since procedure but is passing gas and tolerating diet but not much. Patient denied fevers, chills, night sweats, chest pain, SOB, n/v, LE swelling.     VITAL SIGNS:  Vital Signs Last 24 Hrs  T(C): 36.4 (2021 07:51), Max: 36.7 (2021 17:55)  T(F): 97.5 (2021 07:51), Max: 98 (2021 17:55)  HR: 63 (2021 07:51) (52 - 71)  BP: 126/74 (2021 07:51) (112/64 - 150/67)  BP(mean): 86 (2021 10:35) (84 - 86)  RR: 14 (2021 07:51) (13 - 21)  SpO2: 97% (2021 07:51) (88% - 98%)    PHYSICAL EXAM:    General: WDWN  HEENT: NC/AT; PERRL, anicteric sclera; MMM  Neck: supple  Cardiovascular: +S1/S2, RRR  Respiratory: b/l crackles at the bases   Gastrointestinal: soft, NT/ND; +BSx4  Extremities: WWP; no edema, clubbing or cyanosis  Vascular: 2+ radial, DP/PT pulses B/L  Neurological: AAOx3    MEDICATIONS:  MEDICATIONS  (STANDING):  acetaminophen   Tablet .. 650 milliGRAM(s) Oral every 6 hours  amLODIPine   Tablet 5 milliGRAM(s) Oral daily  atorvastatin 20 milliGRAM(s) Oral at bedtime  enalapril 10 milliGRAM(s) Oral two times a day  ketorolac   Injectable 15 milliGRAM(s) IV Push every 8 hours  tamsulosin 0.4 milliGRAM(s) Oral at bedtime    MEDICATIONS  (PRN):  ondansetron Injectable 4 milliGRAM(s) IV Push every 8 hours PRN Nausea and/or Vomiting      ALLERGIES:  Allergies    No Known Allergies    Intolerances        LABS:                        9.2    8.13  )-----------( 219      ( 2021 08:15 )             30.0     06-26    135  |  101  |  22  ----------------------------<  93  4.0   |  23  |  1.04    Ca    8.3<L>      2021 08:15  Phos  4.1     -  Mg     2.0         TPro  6.6  /  Alb  3.7  /  TBili  0.4  /  DBili  x   /  AST  15  /  ALT  12  /  AlkPhos  77  06-24    PT/INR - ( 2021 14:06 )   PT: 14.4 sec;   INR: 1.21          PTT - ( 2021 14:06 )  PTT:28.8 sec  Urinalysis Basic - ( 2021 06:01 )    Color: Pink / Appearance: SL Cloudy / S.015 / pH: x  Gluc: x / Ketone: NEGATIVE  / Bili: Negative / Urobili: 0.2 E.U./dL   Blood: x / Protein: 30 mg/dL / Nitrite: NEGATIVE   Leuk Esterase: NEGATIVE / RBC: > 10 /HPF / WBC < 5 /HPF   Sq Epi: x / Non Sq Epi: 0-5 /HPF / Bacteria: Present /HPF      CAPILLARY BLOOD GLUCOSE          RADIOLOGY & ADDITIONAL TESTS: Reviewed.

## 2021-06-26 NOTE — PROGRESS NOTE ADULT - ATTENDING COMMENTS
Hypoxia resolved with lasix  ? acute on chronic chf preEF exacerbation  cont lasix and f/u with cardiology    Follow urology recommendations

## 2021-06-26 NOTE — DISCHARGE NOTE PROVIDER - NSDCFUADDINST_GEN_ALL_CORE_FT
Stay well hydrated, you may shower. No strenuous activity until you see Dr Hyatt.  Call his office on Monday and make a follow up appointment for this Wednesday when he can do a trial of void in order to takeout the catheter.  If you have any nausea, vomiting, severe abdominal pain or the catheter stops draining, please call the dr or report to the emergency room.  Spear Catheter/ leg bag care as instructed by nurses

## 2021-06-26 NOTE — DISCHARGE NOTE PROVIDER - HOSPITAL COURSE
89 yo male with a bladder mass underwent a TURBT on 6/25, which ws uncomplicated 89 yo male with a bladder mass underwent a TURBT on 6/25, which ws uncomplicated.  Post procedure the patient was found

## 2021-06-26 NOTE — DISCHARGE NOTE PROVIDER - NSDCCPTREATMENT_GEN_ALL_CORE_FT
PRINCIPAL PROCEDURE  Procedure: Cystourethroscopy with resection of large tumor of bladder  Findings and Treatment:       
PRINCIPAL PROCEDURE  Procedure: Cystourethroscopy with resection of large tumor of bladder  Findings and Treatment:

## 2021-06-29 LAB — SURGICAL PATHOLOGY STUDY: SIGNIFICANT CHANGE UP

## 2021-06-30 ENCOUNTER — APPOINTMENT (OUTPATIENT)
Dept: UROLOGY | Facility: CLINIC | Age: 86
End: 2021-06-30
Payer: MEDICARE

## 2021-06-30 VITALS — TEMPERATURE: 98 F | SYSTOLIC BLOOD PRESSURE: 156 MMHG | DIASTOLIC BLOOD PRESSURE: 62 MMHG | HEART RATE: 106 BPM

## 2021-06-30 PROCEDURE — 51798 US URINE CAPACITY MEASURE: CPT

## 2021-06-30 PROCEDURE — 51703 INSERT BLADDER CATH COMPLEX: CPT | Mod: 59

## 2021-06-30 PROCEDURE — 51741 ELECTRO-UROFLOWMETRY FIRST: CPT

## 2021-06-30 PROCEDURE — 99024 POSTOP FOLLOW-UP VISIT: CPT

## 2021-06-30 NOTE — REVIEW OF SYSTEMS
[Negative] : Heme/Lymph [Incontinence] : incontinence [Nocturia] : nocturia [Loss of interest] : denies loss of interest in sexual activity [Urine Infection (bladder/kidney)] : denies bladder/kidney infections [Pain during urination] : denies pain during urination [Pain at onset of urination] : denies pain during onset of urination [Pain after urination] : denies pain after urination [Blood in urine that you can see] : blood visible in urine [Told you have blood in urine on a urine test] : denies being told that blood was present in a urine test [Discharge from urine canal] : denies discharge from urine canal [History of kidney stones] : denies history of kidney stones [Urine retention] : denies urine retention [Wake up at night to urinate  How many times?  ___] : wakes up to urinate [unfilled] times during the night [Strong urge to urinate] : denies strong urge to urinate [Bladder pressure] : denies bladder pressure [Strain or push to urinate] : denies straining or pushing to urinate [Wait a long time to urinate] : denies waiting a long time to urinate [Slow urine stream] : denies slow urine stream [Interrupted urine stream] : denies interrupted urine stream [Bladder fullness after urinating] : denies bladder fullness after urinating [Increased pain/discomfort with bladder filling] : denies increased pain/discomfort with bladder filling [Bladder problems as child. If yes, describe..] : denies bladder problems as child [Leakage of urine with straining, coughing, laughing] : denies leakage of urine with straining, coughing, and/or laughing [Unaware of when urine is leaking] : aware of when urine is leaking

## 2021-06-30 NOTE — ASSESSMENT
[FreeTextEntry1] : The patient is stable clinically and feels better after his transfusion.  Today he was unable to void adequately likely due to persistent edema on top of his large prostate.  We will send him home with urethral catheter and he will increase tamsulosin to 0.8 mg daily.  He will come back in 1 week for another voiding trial.

## 2021-06-30 NOTE — PHYSICAL EXAM
[General Appearance - Well Developed] : well developed [General Appearance - Well Nourished] : well nourished [Normal Appearance] : normal appearance [Well Groomed] : well groomed [General Appearance - In No Acute Distress] : no acute distress [Abdomen Soft] : soft [Abdomen Tenderness] : non-tender [Costovertebral Angle Tenderness] : no ~M costovertebral angle tenderness [Urethral Meatus] : meatus normal [Penis Abnormality] : normal uncircumcised penis [Urinary Bladder Findings] : the bladder was normal on palpation [Scrotum] : the scrotum was normal [Testes Mass (___cm)] : there were no testicular masses [FreeTextEntry1] : catheter in place, draining clear urine [Edema] : no peripheral edema [] : no respiratory distress [Respiration, Rhythm And Depth] : normal respiratory rhythm and effort [Exaggerated Use Of Accessory Muscles For Inspiration] : no accessory muscle use [Oriented To Time, Place, And Person] : oriented to person, place, and time [Affect] : the affect was normal [Mood] : the mood was normal [Not Anxious] : not anxious [Normal Station and Gait] : the gait and station were normal for the patient's age [No Focal Deficits] : no focal deficits [No Palpable Adenopathy] : no palpable adenopathy

## 2021-06-30 NOTE — HISTORY OF PRESENT ILLNESS
[FreeTextEntry1] : The patient is 5 days status post TURBT.  The patient was discharged with indwelling Spear catheter and he presents today for a voiding trial.  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  The patient is without complaints.

## 2021-07-04 DIAGNOSIS — D49.4 NEOPLASM OF UNSPECIFIED BEHAVIOR OF BLADDER: ICD-10-CM

## 2021-07-04 DIAGNOSIS — E78.5 HYPERLIPIDEMIA, UNSPECIFIED: ICD-10-CM
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
## 2021-07-04 DIAGNOSIS — J90 PLEURAL EFFUSION, NOT ELSEWHERE CLASSIFIED: ICD-10-CM

## 2021-07-04 DIAGNOSIS — R09.02 HYPOXEMIA: ICD-10-CM

## 2021-07-04 DIAGNOSIS — G81.91 HEMIPLEGIA, UNSPECIFIED AFFECTING RIGHT DOMINANT SIDE: ICD-10-CM

## 2021-07-04 DIAGNOSIS — T80.92XA UNSPECIFIED TRANSFUSION REACTION, INITIAL ENCOUNTER: ICD-10-CM

## 2021-07-04 DIAGNOSIS — G93.41 METABOLIC ENCEPHALOPATHY: ICD-10-CM

## 2021-07-04 DIAGNOSIS — D50.0 IRON DEFICIENCY ANEMIA SECONDARY TO BLOOD LOSS (CHRONIC): ICD-10-CM

## 2021-07-04 DIAGNOSIS — I10 ESSENTIAL (PRIMARY) HYPERTENSION: ICD-10-CM

## 2021-07-04 DIAGNOSIS — J96.01 ACUTE RESPIRATORY FAILURE WITH HYPOXIA: ICD-10-CM

## 2021-07-04 DIAGNOSIS — D32.9 BENIGN NEOPLASM OF MENINGES, UNSPECIFIED: ICD-10-CM
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
## 2021-07-04 DIAGNOSIS — I45.10 UNSPECIFIED RIGHT BUNDLE-BRANCH BLOCK: ICD-10-CM

## 2021-07-04 DIAGNOSIS — Z86.16 PERSONAL HISTORY OF COVID-19: ICD-10-CM

## 2021-07-04 DIAGNOSIS — N40.0 BENIGN PROSTATIC HYPERPLASIA WITHOUT LOWER URINARY TRACT SYMPTOMS: ICD-10-CM

## 2021-07-09 ENCOUNTER — APPOINTMENT (OUTPATIENT)
Dept: UROLOGY | Facility: CLINIC | Age: 86
End: 2021-07-09
Payer: MEDICARE

## 2021-07-09 VITALS — HEART RATE: 99 BPM | SYSTOLIC BLOOD PRESSURE: 152 MMHG | DIASTOLIC BLOOD PRESSURE: 76 MMHG | TEMPERATURE: 97.2 F

## 2021-07-09 PROCEDURE — 51703 INSERT BLADDER CATH COMPLEX: CPT | Mod: 59

## 2021-07-09 PROCEDURE — 51798 US URINE CAPACITY MEASURE: CPT

## 2021-07-09 PROCEDURE — 99212 OFFICE O/P EST SF 10 MIN: CPT | Mod: 25

## 2021-07-09 PROCEDURE — 51741 ELECTRO-UROFLOWMETRY FIRST: CPT

## 2021-07-10 NOTE — ASSESSMENT
[FreeTextEntry1] :  The patient's urination is improved but he still has significant postvoid residual.  Because of that a urethral catheter was reinserted today.  He was encouraged to continue with tamsulosin, 0.8 mg daily and will return in 1 week for another voiding trial.

## 2021-07-10 NOTE — HISTORY OF PRESENT ILLNESS
[FreeTextEntry1] : The patient is without new complaints.  He presents for a voiding trial today.  Urination is improved but still has significant postvoid residual.  Because of that, catheter was reinserted and he will come back for another voiding trial in 1 week

## 2021-07-16 ENCOUNTER — APPOINTMENT (OUTPATIENT)
Dept: UROLOGY | Facility: CLINIC | Age: 86
End: 2021-07-16
Payer: MEDICARE

## 2021-07-16 ENCOUNTER — APPOINTMENT (OUTPATIENT)
Dept: UROLOGY | Facility: AMBULATORY SURGERY CENTER | Age: 86
End: 2021-07-16

## 2021-07-16 VITALS — DIASTOLIC BLOOD PRESSURE: 77 MMHG | HEART RATE: 72 BPM | TEMPERATURE: 98.1 F | SYSTOLIC BLOOD PRESSURE: 150 MMHG

## 2021-07-16 DIAGNOSIS — R33.9 RETENTION OF URINE, UNSPECIFIED: ICD-10-CM

## 2021-07-16 PROCEDURE — 51741 ELECTRO-UROFLOWMETRY FIRST: CPT

## 2021-07-16 PROCEDURE — 51798 US URINE CAPACITY MEASURE: CPT

## 2021-07-16 PROCEDURE — 51700 IRRIGATION OF BLADDER: CPT

## 2021-07-16 PROCEDURE — A4217: CPT | Mod: NC

## 2021-07-16 PROCEDURE — 99213 OFFICE O/P EST LOW 20 MIN: CPT | Mod: 25

## 2021-07-16 NOTE — ASSESSMENT
[FreeTextEntry1] : The patient stable clinically.  He was able to void multiple times with a significant bout acceptable postvoid residual and he felt quite comfortable.  As such we will send him home without a catheter; he will continue with tamsulosin, 0.8 mg daily and he was advised to come back or to the emergency room if he is unable to void.  He will come back early next week to reassess his voiding and emptying parameters and we will discuss further management of his urothelial carcinoma at that point.

## 2021-07-16 NOTE — HISTORY OF PRESENT ILLNESS
[FreeTextEntry1] : The patient is without new complaints.  He is tolerating the catheter and urine has been clear.  He presents today for a voiding trial.

## 2021-07-19 ENCOUNTER — APPOINTMENT (OUTPATIENT)
Dept: UROLOGY | Facility: CLINIC | Age: 86
End: 2021-07-19
Payer: MEDICARE

## 2021-07-19 VITALS — HEART RATE: 106 BPM | DIASTOLIC BLOOD PRESSURE: 55 MMHG | SYSTOLIC BLOOD PRESSURE: 119 MMHG | TEMPERATURE: 97.88 F

## 2021-07-19 PROCEDURE — 99213 OFFICE O/P EST LOW 20 MIN: CPT

## 2021-07-19 PROCEDURE — 51798 US URINE CAPACITY MEASURE: CPT

## 2021-07-19 PROCEDURE — 51741 ELECTRO-UROFLOWMETRY FIRST: CPT

## 2021-07-19 RX ORDER — NITROFURANTOIN MACROCRYSTALS 50 MG/1
50 CAPSULE ORAL
Qty: 14 | Refills: 0 | Status: DISCONTINUED | COMMUNITY
Start: 2021-06-02 | End: 2021-07-19

## 2021-07-19 NOTE — ASSESSMENT
[FreeTextEntry1] : The patient is stable clinically with improved bladder emptying.  We will continue him on tamsulosin, 0.8 mg daily and will add finasteride, 5 mg daily.  He will come back in 1 month and at that time, we will likely initiate intravesical BCG therapy.

## 2021-07-19 NOTE — REVIEW OF SYSTEMS
[Nocturia] : nocturia [Loss of interest] : denies loss of interest in sexual activity [Urine Infection (bladder/kidney)] : denies bladder/kidney infections [Pain during urination] : denies pain during urination [Pain at onset of urination] : denies pain during onset of urination [Pain after urination] : denies pain after urination [Blood in urine that you can see] : blood visible in urine [Told you have blood in urine on a urine test] : denies being told that blood was present in a urine test [Discharge from urine canal] : denies discharge from urine canal [History of kidney stones] : denies history of kidney stones [Urine retention] : denies urine retention [Wake up at night to urinate  How many times?  ___] : wakes up to urinate [unfilled] times during the night [Strong urge to urinate] : denies strong urge to urinate [Bladder pressure] : denies bladder pressure [Strain or push to urinate] : denies straining or pushing to urinate [Wait a long time to urinate] : denies waiting a long time to urinate [Slow urine stream] : denies slow urine stream [Interrupted urine stream] : denies interrupted urine stream [Bladder fullness after urinating] : denies bladder fullness after urinating [Increased pain/discomfort with bladder filling] : denies increased pain/discomfort with bladder filling [Bladder problems as child. If yes, describe..] : denies bladder problems as child [Leakage of urine with straining, coughing, laughing] : denies leakage of urine with straining, coughing, and/or laughing [Unaware of when urine is leaking] : aware of when urine is leaking

## 2021-07-19 NOTE — LETTER BODY
[Dear  ___] : Dear  [unfilled], [Courtesy Letter:] : I had the pleasure of seeing your patient, [unfilled], in my office today. [Please see my note below.] : Please see my note below. [Consult Closing:] : Thank you very much for allowing me to participate in the care of this patient.  If you have any questions, please do not hesitate to contact me. [Sincerely,] : Sincerely, [FreeTextEntry3] : William

## 2021-07-19 NOTE — HISTORY OF PRESENT ILLNESS
[FreeTextEntry1] : The patient did well over the weekend; he was voiding with a good stream and no dysuria or hematuria.  He has nocturia x2-3.

## 2021-08-23 ENCOUNTER — APPOINTMENT (OUTPATIENT)
Dept: UROLOGY | Facility: CLINIC | Age: 86
End: 2021-08-23
Payer: MEDICARE

## 2021-08-23 PROCEDURE — 51720 TREATMENT OF BLADDER LESION: CPT

## 2021-08-23 PROCEDURE — 51798 US URINE CAPACITY MEASURE: CPT

## 2021-08-23 PROCEDURE — 51741 ELECTRO-UROFLOWMETRY FIRST: CPT

## 2021-08-23 NOTE — LETTER BODY
[Dear  ___] : Dear  [unfilled], [Courtesy Letter:] : I had the pleasure of seeing your patient, [unfilled], in my office today. [Please see my note below.] : Please see my note below. [Consult Closing:] : Thank you very much for allowing me to participate in the care of this patient.  If you have any questions, please do not hesitate to contact me. [Sincerely,] : Sincerely, [FreeTextEntry3] : William Hyatt MD  [DrDell  ___] : Dr. STRAUSS

## 2021-08-23 NOTE — ASSESSMENT
[FreeTextEntry1] : The patient stable clinically with moderate voiding symptoms and postvoid residual and we will continue him on tamsulosin and finasteride.  The patient had an uneventful instillation of BCG today and he will come for further installations once a week for 5 more doses.  He will be scheduled for follow-up cystoscopy and bladder biopsy after the last BCG instillation.

## 2021-08-23 NOTE — REVIEW OF SYSTEMS
[Negative] : Heme/Lymph [Nocturia] : nocturia [Loss of interest] : denies loss of interest in sexual activity [Urine Infection (bladder/kidney)] : denies bladder/kidney infections [Pain during urination] : denies pain during urination [Pain at onset of urination] : denies pain during onset of urination [Pain after urination] : denies pain after urination [Blood in urine that you can see] : blood visible in urine [Told you have blood in urine on a urine test] : denies being told that blood was present in a urine test [Discharge from urine canal] : denies discharge from urine canal [History of kidney stones] : denies history of kidney stones [Urine retention] : denies urine retention [Wake up at night to urinate  How many times?  ___] : wakes up to urinate [unfilled] times during the night [Strong urge to urinate] : denies strong urge to urinate [Bladder pressure] : denies bladder pressure [Strain or push to urinate] : denies straining or pushing to urinate [Wait a long time to urinate] : denies waiting a long time to urinate [Slow urine stream] : denies slow urine stream [Interrupted urine stream] : denies interrupted urine stream [Bladder fullness after urinating] : denies bladder fullness after urinating [Increased pain/discomfort with bladder filling] : denies increased pain/discomfort with bladder filling [Bladder problems as child. If yes, describe..] : denies bladder problems as child [Leakage of urine with straining, coughing, laughing] : denies leakage of urine with straining, coughing, and/or laughing [Unaware of when urine is leaking] : aware of when urine is leaking

## 2021-08-23 NOTE — HISTORY OF PRESENT ILLNESS
[FreeTextEntry1] : The patient is without new voiding complaints.  He is voiding every 2 hours without dysuria or hematuria.  He presents today for follow-up and first BCG installation.

## 2021-09-01 ENCOUNTER — APPOINTMENT (OUTPATIENT)
Dept: UROLOGY | Facility: CLINIC | Age: 86
End: 2021-09-01
Payer: MEDICARE

## 2021-09-01 VITALS
DIASTOLIC BLOOD PRESSURE: 73 MMHG | TEMPERATURE: 207.68 F | SYSTOLIC BLOOD PRESSURE: 117 MMHG | OXYGEN SATURATION: 98 % | HEART RATE: 67 BPM | RESPIRATION RATE: 18 BRPM

## 2021-09-01 PROCEDURE — A4217: CPT | Mod: NC

## 2021-09-01 PROCEDURE — 51720 TREATMENT OF BLADDER LESION: CPT

## 2021-09-01 PROCEDURE — 99212 OFFICE O/P EST SF 10 MIN: CPT | Mod: 25

## 2021-09-01 RX ORDER — BACILLUS CALMETTE-GUERIN 50 MG/50ML
50 POWDER, FOR SUSPENSION INTRAVESICAL
Qty: 0 | Refills: 0 | Status: COMPLETED | OUTPATIENT
Start: 2021-09-01

## 2021-09-01 RX ADMIN — BACILLUS CALMETTE-GUERIN 0 MG: 50 POWDER, FOR SUSPENSION INTRAVESICAL at 00:00

## 2021-09-01 NOTE — HISTORY OF PRESENT ILLNESS
[FreeTextEntry1] : The patient had no problems after his first BCG treatment.  He denies dysuria, hematuria or suprapubic or flank pain he presents today for second installation.

## 2021-09-01 NOTE — PHYSICAL EXAM
[General Appearance - Well Developed] : well developed [General Appearance - Well Nourished] : well nourished [Normal Appearance] : normal appearance [Well Groomed] : well groomed [General Appearance - In No Acute Distress] : no acute distress [Abdomen Soft] : soft [Abdomen Tenderness] : non-tender [Costovertebral Angle Tenderness] : no ~M costovertebral angle tenderness [Urethral Meatus] : meatus normal [Penis Abnormality] : normal uncircumcised penis [Urinary Bladder Findings] : the bladder was normal on palpation [Scrotum] : the scrotum was normal [Testes Mass (___cm)] : there were no testicular masses [Edema] : no peripheral edema [] : no respiratory distress [Respiration, Rhythm And Depth] : normal respiratory rhythm and effort [Exaggerated Use Of Accessory Muscles For Inspiration] : no accessory muscle use [Oriented To Time, Place, And Person] : oriented to person, place, and time [Mood] : the mood was normal [Affect] : the affect was normal [Not Anxious] : not anxious [Normal Station and Gait] : the gait and station were normal for the patient's age [No Focal Deficits] : no focal deficits [No Palpable Adenopathy] : no palpable adenopathy

## 2021-09-08 ENCOUNTER — APPOINTMENT (OUTPATIENT)
Dept: UROLOGY | Facility: CLINIC | Age: 86
End: 2021-09-08
Payer: MEDICARE

## 2021-09-08 VITALS — DIASTOLIC BLOOD PRESSURE: 80 MMHG | SYSTOLIC BLOOD PRESSURE: 168 MMHG | TEMPERATURE: 208.04 F

## 2021-09-08 PROCEDURE — 51720 TREATMENT OF BLADDER LESION: CPT

## 2021-09-08 PROCEDURE — 99212 OFFICE O/P EST SF 10 MIN: CPT | Mod: 25

## 2021-09-08 RX ORDER — BACILLUS CALMETTE-GUERIN 50 MG/50ML
50 POWDER, FOR SUSPENSION INTRAVESICAL
Qty: 0 | Refills: 0 | Status: COMPLETED | OUTPATIENT
Start: 2021-09-08

## 2021-09-08 RX ADMIN — BACILLUS CALMETTE-GUERIN 0 MG: 50 POWDER, FOR SUSPENSION INTRAVESICAL at 00:00

## 2021-09-08 NOTE — ASSESSMENT
[FreeTextEntry1] : Patient is stable clinically. He had an uneventful instillation of BCG today. He will come back in one week for BCG#4

## 2021-09-15 ENCOUNTER — APPOINTMENT (OUTPATIENT)
Dept: UROLOGY | Facility: CLINIC | Age: 86
End: 2021-09-15
Payer: MEDICARE

## 2021-09-15 VITALS — TEMPERATURE: 207.32 F | DIASTOLIC BLOOD PRESSURE: 75 MMHG | SYSTOLIC BLOOD PRESSURE: 158 MMHG | HEART RATE: 71 BPM

## 2021-09-15 PROCEDURE — 51720 TREATMENT OF BLADDER LESION: CPT

## 2021-09-15 RX ORDER — BACILLUS CALMETTE-GUERIN 50 MG/50ML
50 POWDER, FOR SUSPENSION INTRAVESICAL
Qty: 0 | Refills: 0 | Status: COMPLETED | OUTPATIENT
Start: 2021-09-15

## 2021-09-15 RX ADMIN — BACILLUS CALMETTE-GUERIN 0 MG: 50 POWDER, FOR SUSPENSION INTRAVESICAL at 00:00

## 2021-09-22 ENCOUNTER — APPOINTMENT (OUTPATIENT)
Dept: UROLOGY | Facility: CLINIC | Age: 86
End: 2021-09-22
Payer: MEDICARE

## 2021-09-22 VITALS — HEART RATE: 77 BPM | TEMPERATURE: 97.6 F | SYSTOLIC BLOOD PRESSURE: 166 MMHG | DIASTOLIC BLOOD PRESSURE: 79 MMHG

## 2021-09-22 PROCEDURE — 51720 TREATMENT OF BLADDER LESION: CPT

## 2021-09-22 RX ORDER — BACILLUS CALMETTE-GUERIN 50 MG/50ML
50 POWDER, FOR SUSPENSION INTRAVESICAL
Qty: 0 | Refills: 0 | Status: COMPLETED | OUTPATIENT
Start: 2021-09-22

## 2021-09-22 NOTE — REVIEW OF SYSTEMS
[Wake up at night to urinate  How many times?  ___] : wakes up to urinate [unfilled] times during the night [Negative] : Heme/Lymph [Nocturia] : nocturia [Loss of interest] : denies loss of interest in sexual activity [Urine Infection (bladder/kidney)] : denies bladder/kidney infections [Pain during urination] : denies pain during urination [Pain at onset of urination] : denies pain during onset of urination [Pain after urination] : denies pain after urination [Blood in urine that you can see] : blood visible in urine [Told you have blood in urine on a urine test] : denies being told that blood was present in a urine test [Discharge from urine canal] : denies discharge from urine canal [History of kidney stones] : denies history of kidney stones [Urine retention] : denies urine retention [Strong urge to urinate] : denies strong urge to urinate [Bladder pressure] : denies bladder pressure [Strain or push to urinate] : denies straining or pushing to urinate [Wait a long time to urinate] : denies waiting a long time to urinate [Slow urine stream] : denies slow urine stream [Interrupted urine stream] : denies interrupted urine stream [Bladder fullness after urinating] : denies bladder fullness after urinating [Increased pain/discomfort with bladder filling] : denies increased pain/discomfort with bladder filling [Bladder problems as child. If yes, describe..] : denies bladder problems as child [Leakage of urine with straining, coughing, laughing] : denies leakage of urine with straining, coughing, and/or laughing [Unaware of when urine is leaking] : aware of when urine is leaking

## 2021-09-22 NOTE — HISTORY OF PRESENT ILLNESS
[FreeTextEntry1] : The patient is without new complaints.  He has been tolerating his BCG instillations.  He presents today for fifth instillation.

## 2021-09-22 NOTE — ASSESSMENT
[FreeTextEntry1] : The patient is stable clinically.  He had an uneventful intravesical BCG today and will come back next week for his last dose.  He will be scheduled for cystoscopy and bladder biopsy after that.

## 2021-09-29 ENCOUNTER — APPOINTMENT (OUTPATIENT)
Dept: UROLOGY | Facility: CLINIC | Age: 86
End: 2021-09-29
Payer: MEDICARE

## 2021-09-29 VITALS — TEMPERATURE: 97.7 F | HEART RATE: 84 BPM | DIASTOLIC BLOOD PRESSURE: 79 MMHG | SYSTOLIC BLOOD PRESSURE: 163 MMHG

## 2021-09-29 PROCEDURE — 51720 TREATMENT OF BLADDER LESION: CPT

## 2021-09-29 RX ORDER — BACILLUS CALMETTE-GUERIN 50 MG/50ML
50 POWDER, FOR SUSPENSION INTRAVESICAL
Qty: 0 | Refills: 0 | Status: COMPLETED | OUTPATIENT
Start: 2021-09-29

## 2021-09-29 RX ADMIN — BACILLUS CALMETTE-GUERIN 0 MG: 50 POWDER, FOR SUSPENSION INTRAVESICAL at 00:00

## 2021-09-29 NOTE — REVIEW OF SYSTEMS
[Nocturia] : nocturia [Loss of interest] : denies loss of interest in sexual activity [Urine Infection (bladder/kidney)] : denies bladder/kidney infections [Pain during urination] : denies pain during urination [Pain at onset of urination] : denies pain during onset of urination [Pain after urination] : denies pain after urination [Blood in urine that you can see] : blood visible in urine [Told you have blood in urine on a urine test] : denies being told that blood was present in a urine test [Discharge from urine canal] : denies discharge from urine canal [History of kidney stones] : denies history of kidney stones [Urine retention] : denies urine retention [Wake up at night to urinate  How many times?  ___] : wakes up to urinate [unfilled] times during the night [Strong urge to urinate] : denies strong urge to urinate [Bladder pressure] : denies bladder pressure [Strain or push to urinate] : denies straining or pushing to urinate [Wait a long time to urinate] : denies waiting a long time to urinate [Slow urine stream] : denies slow urine stream [Interrupted urine stream] : denies interrupted urine stream [Bladder fullness after urinating] : denies bladder fullness after urinating [Increased pain/discomfort with bladder filling] : denies increased pain/discomfort with bladder filling [Bladder problems as child. If yes, describe..] : denies bladder problems as child [Leakage of urine with straining, coughing, laughing] : denies leakage of urine with straining, coughing, and/or laughing [Unaware of when urine is leaking] : aware of when urine is leaking [Negative] : Heme/Lymph

## 2021-09-29 NOTE — ASSESSMENT
[FreeTextEntry1] : The patient is stable clinically and has tolerated his intravesical therapy well.  He will be scheduled for cystoscopy, bladder biopsy and possible TURBT on November 9.  He will see Dr. Armenta for medical clearance.

## 2021-09-29 NOTE — HISTORY OF PRESENT ILLNESS
[FreeTextEntry1] : The patient is without new clinical complaints.  He has been tolerating BCG instillations and presents today for his last one.

## 2021-11-03 DIAGNOSIS — Z01.818 ENCOUNTER FOR OTHER PREPROCEDURAL EXAMINATION: ICD-10-CM

## 2021-11-06 ENCOUNTER — APPOINTMENT (OUTPATIENT)
Dept: DISASTER EMERGENCY | Facility: CLINIC | Age: 86
End: 2021-11-06

## 2021-11-07 LAB — SARS-COV-2 N GENE NPH QL NAA+PROBE: NOT DETECTED

## 2021-11-08 ENCOUNTER — TRANSCRIPTION ENCOUNTER (OUTPATIENT)
Age: 86
End: 2021-11-08

## 2021-11-08 RX ORDER — FERROUS SULFATE 325(65) MG
0 TABLET ORAL
Qty: 0 | Refills: 0 | DISCHARGE

## 2021-11-08 RX ORDER — CHOLECALCIFEROL (VITAMIN D3) 125 MCG
0 CAPSULE ORAL
Qty: 0 | Refills: 0 | DISCHARGE

## 2021-11-08 NOTE — ASU PATIENT PROFILE, ADULT - NSICDXPASTSURGICALHX_GEN_ALL_CORE_FT
PAST SURGICAL HISTORY:  Anemia due to chronic blood loss     Hemiplegia, dominant side S/P CVA (cerebrovascular accident)     History of appendectomy     History of BPH     History of cardiac monitoring     HTN (hypertension)      PAST SURGICAL HISTORY:  History of appendectomy     History of cardiac monitoring

## 2021-11-08 NOTE — ASU PATIENT PROFILE, ADULT - NSICDXPASTMEDICALHX_GEN_ALL_CORE_FT
PAST MEDICAL HISTORY:  CVA (cerebrovascular accident)     Diabetes pre    H/O aortic valve disease     H/O: HTN (hypertension)     Hyperlipidemia     Medical condition not demonstrated nonrheumatic mitral valve disorder    Meningioma      PAST MEDICAL HISTORY:  CVA (cerebrovascular accident) 2/2016    Diabetes pre    H/O aortic valve disease     H/O meningitis at age 5    HTN (hypertension)     Hyperlipidemia     Malignant neoplasm of bladder     Medical condition not demonstrated nonrheumatic mitral valve disorder    Meningioma

## 2021-11-09 ENCOUNTER — OUTPATIENT (OUTPATIENT)
Dept: INPATIENT UNIT | Facility: HOSPITAL | Age: 86
LOS: 1 days | Discharge: HOME | End: 2021-11-09
Payer: MEDICARE

## 2021-11-09 ENCOUNTER — RESULT REVIEW (OUTPATIENT)
Age: 86
End: 2021-11-09

## 2021-11-09 ENCOUNTER — NON-APPOINTMENT (OUTPATIENT)
Age: 86
End: 2021-11-09

## 2021-11-09 ENCOUNTER — APPOINTMENT (OUTPATIENT)
Dept: UROLOGY | Facility: AMBULATORY SURGERY CENTER | Age: 86
End: 2021-11-09

## 2021-11-09 VITALS
HEART RATE: 76 BPM | SYSTOLIC BLOOD PRESSURE: 164 MMHG | TEMPERATURE: 98 F | DIASTOLIC BLOOD PRESSURE: 92 MMHG | WEIGHT: 168.43 LBS | OXYGEN SATURATION: 98 % | RESPIRATION RATE: 18 BRPM | HEIGHT: 68.5 IN

## 2021-11-09 VITALS
DIASTOLIC BLOOD PRESSURE: 68 MMHG | SYSTOLIC BLOOD PRESSURE: 138 MMHG | RESPIRATION RATE: 23 BRPM | OXYGEN SATURATION: 96 % | HEART RATE: 73 BPM

## 2021-11-09 DIAGNOSIS — I69.359 HEMIPLEGIA AND HEMIPARESIS FOLLOWING CEREBRAL INFARCTION AFFECTING UNSPECIFIED SIDE: Chronic | ICD-10-CM

## 2021-11-09 DIAGNOSIS — Z92.89 PERSONAL HISTORY OF OTHER MEDICAL TREATMENT: Chronic | ICD-10-CM

## 2021-11-09 DIAGNOSIS — Z90.49 ACQUIRED ABSENCE OF OTHER SPECIFIED PARTS OF DIGESTIVE TRACT: Chronic | ICD-10-CM

## 2021-11-09 DIAGNOSIS — Z87.438 PERSONAL HISTORY OF OTHER DISEASES OF MALE GENITAL ORGANS: Chronic | ICD-10-CM

## 2021-11-09 DIAGNOSIS — I10 ESSENTIAL (PRIMARY) HYPERTENSION: Chronic | ICD-10-CM

## 2021-11-09 DIAGNOSIS — D50.0 IRON DEFICIENCY ANEMIA SECONDARY TO BLOOD LOSS (CHRONIC): Chronic | ICD-10-CM

## 2021-11-09 PROCEDURE — 52234 CYSTOSCOPY AND TREATMENT: CPT

## 2021-11-09 PROCEDURE — 88342 IMHCHEM/IMCYTCHM 1ST ANTB: CPT | Mod: 26

## 2021-11-09 PROCEDURE — 52204 CYSTOSCOPY W/BIOPSY(S): CPT

## 2021-11-09 PROCEDURE — 88305 TISSUE EXAM BY PATHOLOGIST: CPT | Mod: 26

## 2021-11-09 PROCEDURE — 88341 IMHCHEM/IMCYTCHM EA ADD ANTB: CPT | Mod: 26

## 2021-11-09 PROCEDURE — 88305 TISSUE EXAM BY PATHOLOGIST: CPT

## 2021-11-09 PROCEDURE — 88341 IMHCHEM/IMCYTCHM EA ADD ANTB: CPT

## 2021-11-09 RX ORDER — CHOLECALCIFEROL (VITAMIN D3) 125 MCG
0 CAPSULE ORAL
Qty: 0 | Refills: 0 | DISCHARGE

## 2021-11-09 RX ORDER — TAMSULOSIN HYDROCHLORIDE 0.4 MG/1
1 CAPSULE ORAL
Qty: 0 | Refills: 0 | DISCHARGE

## 2021-11-09 RX ORDER — CHOLECALCIFEROL (VITAMIN D3) 125 MCG
1 CAPSULE ORAL
Qty: 0 | Refills: 0 | DISCHARGE

## 2021-11-09 RX ORDER — FERROUS SULFATE 325(65) MG
0 TABLET ORAL
Qty: 0 | Refills: 0 | DISCHARGE

## 2021-11-09 RX ORDER — ATORVASTATIN CALCIUM 80 MG/1
1 TABLET, FILM COATED ORAL
Qty: 0 | Refills: 0 | DISCHARGE

## 2021-11-09 RX ORDER — AMLODIPINE BESYLATE 2.5 MG/1
1 TABLET ORAL
Qty: 0 | Refills: 0 | DISCHARGE

## 2021-11-09 RX ORDER — CLOPIDOGREL BISULFATE 75 MG/1
1 TABLET, FILM COATED ORAL
Qty: 0 | Refills: 0 | DISCHARGE

## 2021-11-09 NOTE — ASU PREOP CHECKLIST - LATEX ALLERGY
Implemented All Fall Risk Interventions:  [place] to call system. Call bell, personal items and telephone within reach. Instruct patient to call for assistance. Room bathroom lighting operational. Non-slip footwear when patient is off stretcher. Physically safe environment: no spills, clutter or unnecessary equipment. Stretcher in lowest position, wheels locked, appropriate side rails in place. Provide visual cue, wrist band, yellow gown, etc. Monitor gait and stability. Monitor for mental status changes and reorient to person, place, and time. Review medications for side effects contributing to fall risk. Reinforce activity limits and safety measures with patient and family. no

## 2021-11-09 NOTE — ASU DISCHARGE PLAN (ADULT/PEDIATRIC) - CALL YOUR DOCTOR IF YOU HAVE ANY OF THE FOLLOWING:
89F/Bleeding that does not stop/Pain not relieved by Medications/Fever greater than (need to indicate Fahrenheit or Celsius)/Nausea and vomiting that does not stop/Unable to urinate

## 2021-11-09 NOTE — ASU DISCHARGE PLAN (ADULT/PEDIATRIC) - CARE PROVIDER_API CALL
William Hyatt)  Urology  [address] [place] [address]  Phone: [phone]  Fax: [phone]  Established Patient  Follow Up Time: 2 weeks

## 2021-11-09 NOTE — BRIEF OPERATIVE NOTE - NSICDXBRIEFPROCEDURE_GEN_ALL_CORE_FT
PROCEDURES:  Cystoscopy with biopsy and fulguration of bladder 09-Nov-2021 11:19:36  Zbigniew Fuller F

## 2021-11-09 NOTE — PACU DISCHARGE NOTE - COMMENTS
Patient AAOx4, denies pain voided 200ml as required after removal of lugo catheter , tolerated food offered without a problem discharge instructions provided to son verbalized understanding and diuscharge on a wheelhair accompanied by son on a wheelchair

## 2021-11-11 LAB — SURGICAL PATHOLOGY STUDY: SIGNIFICANT CHANGE UP

## 2021-11-12 ENCOUNTER — NON-APPOINTMENT (OUTPATIENT)
Age: 86
End: 2021-11-12

## 2021-11-12 PROBLEM — E11.9 TYPE 2 DIABETES MELLITUS WITHOUT COMPLICATIONS: Chronic | Status: ACTIVE | Noted: 2021-11-08

## 2021-11-12 PROBLEM — Z86.61 PERSONAL HISTORY OF INFECTIONS OF THE CENTRAL NERVOUS SYSTEM: Chronic | Status: ACTIVE | Noted: 2021-11-09

## 2021-11-12 PROBLEM — Z71.1 PERSON WITH FEARED HEALTH COMPLAINT IN WHOM NO DIAGNOSIS IS MADE: Chronic | Status: ACTIVE | Noted: 2021-11-08

## 2021-11-12 PROBLEM — E78.5 HYPERLIPIDEMIA, UNSPECIFIED: Chronic | Status: ACTIVE | Noted: 2021-11-08

## 2021-11-12 PROBLEM — I10 ESSENTIAL (PRIMARY) HYPERTENSION: Chronic | Status: ACTIVE | Noted: 2021-11-09

## 2021-11-12 PROBLEM — I63.9 CEREBRAL INFARCTION, UNSPECIFIED: Chronic | Status: ACTIVE | Noted: 2021-11-08

## 2021-11-12 PROBLEM — D32.9 BENIGN NEOPLASM OF MENINGES, UNSPECIFIED: Chronic | Status: ACTIVE | Noted: 2021-11-08

## 2021-11-12 PROBLEM — C67.9 MALIGNANT NEOPLASM OF BLADDER, UNSPECIFIED: Chronic | Status: ACTIVE | Noted: 2021-11-09

## 2021-11-12 PROBLEM — Z86.79 PERSONAL HISTORY OF OTHER DISEASES OF THE CIRCULATORY SYSTEM: Chronic | Status: ACTIVE | Noted: 2021-11-08

## 2021-12-01 ENCOUNTER — APPOINTMENT (OUTPATIENT)
Dept: UROLOGY | Facility: CLINIC | Age: 86
End: 2021-12-01
Payer: MEDICARE

## 2021-12-01 VITALS — WEIGHT: 160 LBS | HEIGHT: 68 IN | BODY MASS INDEX: 24.25 KG/M2

## 2021-12-01 VITALS — DIASTOLIC BLOOD PRESSURE: 75 MMHG | HEART RATE: 103 BPM | SYSTOLIC BLOOD PRESSURE: 176 MMHG | TEMPERATURE: 96.1 F

## 2021-12-01 DIAGNOSIS — C68.9 MALIGNANT NEOPLASM OF URINARY ORGAN, UNSPECIFIED: ICD-10-CM

## 2021-12-01 PROCEDURE — 51798 US URINE CAPACITY MEASURE: CPT

## 2021-12-01 PROCEDURE — 99214 OFFICE O/P EST MOD 30 MIN: CPT

## 2021-12-01 PROCEDURE — 51736 URINE FLOW MEASUREMENT: CPT

## 2021-12-01 RX ORDER — CLOPIDOGREL 75 MG/1
75 TABLET, FILM COATED ORAL
Refills: 0 | Status: ACTIVE | COMMUNITY

## 2021-12-01 NOTE — REVIEW OF SYSTEMS
[Wake up at night to urinate  How many times?  ___] : wakes up to urinate [unfilled] times during the night [Negative] : Heme/Lymph [Nocturia] : nocturia [Blood in urine that you can see] : blood visible in urine [Loss of interest] : denies loss of interest in sexual activity [Urine Infection (bladder/kidney)] : denies bladder/kidney infections [Pain during urination] : denies pain during urination [Pain at onset of urination] : denies pain during onset of urination [Pain after urination] : denies pain after urination [Told you have blood in urine on a urine test] : denies being told that blood was present in a urine test [Discharge from urine canal] : denies discharge from urine canal [History of kidney stones] : denies history of kidney stones [Urine retention] : denies urine retention [Strong urge to urinate] : denies strong urge to urinate [Bladder pressure] : denies bladder pressure [Strain or push to urinate] : denies straining or pushing to urinate [Wait a long time to urinate] : denies waiting a long time to urinate [Slow urine stream] : denies slow urine stream [Interrupted urine stream] : denies interrupted urine stream [Bladder fullness after urinating] : denies bladder fullness after urinating [Increased pain/discomfort with bladder filling] : denies increased pain/discomfort with bladder filling [Bladder problems as child. If yes, describe..] : denies bladder problems as child [Leakage of urine with straining, coughing, laughing] : denies leakage of urine with straining, coughing, and/or laughing [Unaware of when urine is leaking] : aware of when urine is leaking

## 2021-12-01 NOTE — ASSESSMENT
[FreeTextEntry1] : The patient is stable clinically. He has urinary frequency but is emptying well. We will continue him on tamsulosin and finasteride and he will come back in January for 3 weekly BCG maintenance installations. We will also perform a follow-up renal ultrasound to assess the status of his left renal lesion.

## 2021-12-01 NOTE — HISTORY OF PRESENT ILLNESS
[FreeTextEntry1] : The patient is without new urologic complaints. He is voiding with frequency every 2 hours during the day and night but no dysuria, hematuria or incontinence. Bladder biopsy last month was unremarkable.

## 2022-01-18 ENCOUNTER — LABORATORY RESULT (OUTPATIENT)
Age: 87
End: 2022-01-18

## 2022-01-19 ENCOUNTER — APPOINTMENT (OUTPATIENT)
Dept: UROLOGY | Facility: CLINIC | Age: 87
End: 2022-01-19
Payer: MEDICARE

## 2022-01-19 VITALS
HEIGHT: 68 IN | WEIGHT: 168 LBS | TEMPERATURE: 96.2 F | HEART RATE: 103 BPM | SYSTOLIC BLOOD PRESSURE: 183 MMHG | DIASTOLIC BLOOD PRESSURE: 77 MMHG | BODY MASS INDEX: 25.46 KG/M2

## 2022-01-19 PROCEDURE — 99213 OFFICE O/P EST LOW 20 MIN: CPT | Mod: 25

## 2022-01-19 PROCEDURE — 76705 ECHO EXAM OF ABDOMEN: CPT

## 2022-01-19 PROCEDURE — 51720 TREATMENT OF BLADDER LESION: CPT

## 2022-01-19 RX ORDER — BACILLUS CALMETTE-GUERIN 50 MG/50ML
50 POWDER, FOR SUSPENSION INTRAVESICAL
Qty: 0 | Refills: 0 | Status: COMPLETED | OUTPATIENT
Start: 2022-01-19

## 2022-01-19 RX ORDER — BACILLUS CALMETTE-GUERIN 50 MG/50ML
50 POWDER, FOR SUSPENSION INTRAVESICAL
Qty: 1 | Refills: 0 | Status: ACTIVE | COMMUNITY
Start: 2022-01-19

## 2022-01-19 NOTE — HISTORY OF PRESENT ILLNESS
[FreeTextEntry1] : The patient is without new voiding complaints.  He presents today for first dose of maintenance BCG instillation and follow-up renal ultrasound.

## 2022-01-19 NOTE — ASSESSMENT
[FreeTextEntry1] : The patient stable clinically.  He had an uneventful instillation of BCG today.  Renal ultrasound today revealed a stable right renal mass and a stable left renal lesion.  We will continue him on finasteride and tamsulosin.  We will see him next week for second dose of BCG maintenance.

## 2022-01-26 ENCOUNTER — APPOINTMENT (OUTPATIENT)
Dept: UROLOGY | Facility: CLINIC | Age: 87
End: 2022-01-26
Payer: MEDICARE

## 2022-01-26 VITALS
BODY MASS INDEX: 25.46 KG/M2 | TEMPERATURE: 97.2 F | WEIGHT: 168 LBS | SYSTOLIC BLOOD PRESSURE: 181 MMHG | HEIGHT: 68 IN | HEART RATE: 106 BPM | DIASTOLIC BLOOD PRESSURE: 76 MMHG

## 2022-01-26 PROCEDURE — 51720 TREATMENT OF BLADDER LESION: CPT

## 2022-01-26 RX ORDER — BACILLUS CALMETTE-GUERIN 50 MG/50ML
50 POWDER, FOR SUSPENSION INTRAVESICAL
Qty: 0 | Refills: 0 | Status: COMPLETED | OUTPATIENT
Start: 2022-01-26

## 2022-01-26 RX ADMIN — BACILLUS CALMETTE-GUERIN 0 MG: 50 POWDER, FOR SUSPENSION INTRAVESICAL at 00:00

## 2022-02-02 ENCOUNTER — APPOINTMENT (OUTPATIENT)
Dept: UROLOGY | Facility: CLINIC | Age: 87
End: 2022-02-02
Payer: MEDICARE

## 2022-02-02 VITALS
HEART RATE: 97 BPM | WEIGHT: 168 LBS | BODY MASS INDEX: 27 KG/M2 | SYSTOLIC BLOOD PRESSURE: 180 MMHG | TEMPERATURE: 97.3 F | HEIGHT: 66 IN | DIASTOLIC BLOOD PRESSURE: 82 MMHG

## 2022-02-02 DIAGNOSIS — C67.9 MALIGNANT NEOPLASM OF BLADDER, UNSPECIFIED: ICD-10-CM

## 2022-02-02 PROCEDURE — 99213 OFFICE O/P EST LOW 20 MIN: CPT | Mod: 25

## 2022-02-02 PROCEDURE — 51720 TREATMENT OF BLADDER LESION: CPT

## 2022-02-02 RX ORDER — BACILLUS CALMETTE-GUERIN 50 MG/50ML
50 POWDER, FOR SUSPENSION INTRAVESICAL
Qty: 0 | Refills: 0 | Status: COMPLETED | OUTPATIENT
Start: 2022-02-02

## 2022-02-02 RX ORDER — BACILLUS CALMETTE-GUERIN 50 MG/50ML
50 POWDER, FOR SUSPENSION INTRAVESICAL
Qty: 1 | Refills: 0 | Status: ACTIVE | COMMUNITY
Start: 2022-02-02

## 2022-02-02 RX ORDER — AMLODIPINE BESYLATE 10 MG/1
10 TABLET ORAL
Refills: 0 | Status: ACTIVE | COMMUNITY

## 2022-02-02 RX ADMIN — Medication 0 MG: at 00:00

## 2022-02-02 NOTE — ASSESSMENT
[FreeTextEntry1] : The patient is stable clinically.  We will continue him on tamsulosin and finasteride and he will come back in 1 month for surveillance cystoscopy.

## 2022-02-02 NOTE — HISTORY OF PRESENT ILLNESS
[FreeTextEntry1] : The patient is without new complaints.  He tolerated his last BCG instillation and he comes in for the last one today.

## 2022-03-09 ENCOUNTER — APPOINTMENT (OUTPATIENT)
Dept: UROLOGY | Facility: CLINIC | Age: 87
End: 2022-03-09
Payer: MEDICARE

## 2022-03-09 VITALS
HEART RATE: 89 BPM | BODY MASS INDEX: 27 KG/M2 | SYSTOLIC BLOOD PRESSURE: 184 MMHG | TEMPERATURE: 97.8 F | HEIGHT: 66 IN | WEIGHT: 168 LBS | DIASTOLIC BLOOD PRESSURE: 86 MMHG

## 2022-03-09 PROCEDURE — 52000 CYSTOURETHROSCOPY: CPT

## 2022-03-09 PROCEDURE — 51798 US URINE CAPACITY MEASURE: CPT

## 2022-03-09 PROCEDURE — 51741 ELECTRO-UROFLOWMETRY FIRST: CPT

## 2022-03-09 NOTE — ASSESSMENT
[FreeTextEntry1] : The patient stable clinically with no evidence of recurrent urothelial carcinoma.  He has significant postvoid residual today.  We will continue him on finasteride and will increase tamsulosin back to 0.8 mg daily.  He will come back in 3 months for reassessment including a renal ultrasound.

## 2022-03-09 NOTE — HISTORY OF PRESENT ILLNESS
[FreeTextEntry1] : The patient is voiding adequately during the day and has nocturia x3.  He denies dysuria, hematuria or incontinence.

## 2022-03-09 NOTE — REVIEW OF SYSTEMS
[Nocturia] : nocturia [Blood in urine that you can see] : blood visible in urine [Wake up at night to urinate  How many times?  ___] : wakes up to urinate [unfilled] times during the night [Negative] : Heme/Lymph [Loss of interest] : denies loss of interest in sexual activity [Urine Infection (bladder/kidney)] : denies bladder/kidney infections [Pain during urination] : denies pain during urination [Pain at onset of urination] : denies pain during onset of urination [Pain after urination] : denies pain after urination [Told you have blood in urine on a urine test] : denies being told that blood was present in a urine test [Discharge from urine canal] : denies discharge from urine canal [History of kidney stones] : denies history of kidney stones [Urine retention] : denies urine retention [Strong urge to urinate] : denies strong urge to urinate [Bladder pressure] : denies bladder pressure [Strain or push to urinate] : denies straining or pushing to urinate [Wait a long time to urinate] : denies waiting a long time to urinate [Slow urine stream] : denies slow urine stream [Interrupted urine stream] : denies interrupted urine stream [Bladder fullness after urinating] : denies bladder fullness after urinating [Increased pain/discomfort with bladder filling] : denies increased pain/discomfort with bladder filling [Bladder problems as child. If yes, describe..] : denies bladder problems as child [Leakage of urine with straining, coughing, laughing] : denies leakage of urine with straining, coughing, and/or laughing [Unaware of when urine is leaking] : aware of when urine is leaking

## 2022-07-20 ENCOUNTER — APPOINTMENT (OUTPATIENT)
Dept: UROLOGY | Facility: CLINIC | Age: 87
End: 2022-07-20

## 2022-07-20 VITALS
HEIGHT: 66 IN | BODY MASS INDEX: 27 KG/M2 | SYSTOLIC BLOOD PRESSURE: 130 MMHG | WEIGHT: 168 LBS | TEMPERATURE: 98 F | HEART RATE: 80 BPM | DIASTOLIC BLOOD PRESSURE: 80 MMHG

## 2022-07-20 PROCEDURE — 51741 ELECTRO-UROFLOWMETRY FIRST: CPT

## 2022-07-20 PROCEDURE — 51798 US URINE CAPACITY MEASURE: CPT

## 2022-07-20 PROCEDURE — 52000 CYSTOURETHROSCOPY: CPT

## 2022-07-20 PROCEDURE — 76705 ECHO EXAM OF ABDOMEN: CPT

## 2022-07-20 NOTE — PHYSICAL EXAM
[General Appearance - Well Developed] : well developed [General Appearance - Well Nourished] : well nourished [Normal Appearance] : normal appearance [Well Groomed] : well groomed [General Appearance - In No Acute Distress] : no acute distress [Abdomen Soft] : soft [Costovertebral Angle Tenderness] : no ~M costovertebral angle tenderness [Abdomen Tenderness] : non-tender [Urethral Meatus] : meatus normal [Penis Abnormality] : normal uncircumcised penis [Urinary Bladder Findings] : the bladder was normal on palpation [Scrotum] : the scrotum was normal [Testes Mass (___cm)] : there were no testicular masses [Edema] : no peripheral edema [] : no respiratory distress [Respiration, Rhythm And Depth] : normal respiratory rhythm and effort [Exaggerated Use Of Accessory Muscles For Inspiration] : no accessory muscle use [Oriented To Time, Place, And Person] : oriented to person, place, and time [Affect] : the affect was normal [Mood] : the mood was normal [Not Anxious] : not anxious [Normal Station and Gait] : the gait and station were normal for the patient's age [No Focal Deficits] : no focal deficits [No Palpable Adenopathy] : no palpable adenopathy

## 2022-07-20 NOTE — ASSESSMENT
[FreeTextEntry1] : The patient stable clinically with no evidence of recurrent urothelial carcinoma, stable BPH and a stable renal mass which we will continue treating conservatively.  He will continue with finasteride and tamsulosin and will we will reevaluate him again in 6 months.

## 2022-07-20 NOTE — HISTORY OF PRESENT ILLNESS
[FreeTextEntry1] : The patient is without new clinical complaints.  He is voiding adequately with nocturia x2-3 and no dysuria, hematuria or flank pain.

## 2022-11-02 NOTE — ASSESSMENT
[FreeTextEntry1] : The patient stable and today underwent an uneventful instillation of the second dose of BCG.  He will come back next week for his third treatment.
37.1

## 2023-01-09 ENCOUNTER — APPOINTMENT (OUTPATIENT)
Dept: UROLOGY | Facility: CLINIC | Age: 88
End: 2023-01-09
Payer: MEDICARE

## 2023-01-09 PROCEDURE — 52000 CYSTOURETHROSCOPY: CPT

## 2023-01-09 PROCEDURE — 51798 US URINE CAPACITY MEASURE: CPT

## 2023-01-09 PROCEDURE — 51741 ELECTRO-UROFLOWMETRY FIRST: CPT

## 2023-01-09 NOTE — ASSESSMENT
[FreeTextEntry1] : The patient stable clinically with no evidence of recurrent urothelial carcinoma, stable BPH and a stable renal mass which we will continue treating conservatively.  He will continue with finasteride and tamsulosin and will we will reevaluate him again in 6 months. He will surveillance cystoscopy in one year.

## 2023-01-09 NOTE — PROCEDURE
[Kidney Ultrasound] : kidney ultrasound [___] : [unfilled] [FreeTextEntry2] : RENAL (LIMITED)\par 	\par The kidneys were evaluated in both axial and transverse planes, utilizing the Miroio ultrasound and the 3.5- 4.5 MHz solid state transducer. Views were taken of the upper pole, middle section of the renal pelvis and the lower pole from medial to lateral and the echogenicity compared to surrounding structures.  \par \par \par Right Kidney: 10.5 x 4.0 x 4.2 cm   \par Cortical Thickness:  1.1 cm UP          1.0 cm LP\par \par Left kidney: 11.2 x 3.9 x 4.1 cm			 \par Cortical Thickness:0.9 cm UP     1.0 cm LP\par  \par Echogenicity:  Normal\par \par Bladder:    Not visualized                           \par \par Findings: \par Again visualized in the mid- upper pole of the right kidney is a 3.5 x 3.2 x 2.5 cm mass with vascular flow. Previously measured 3.0 x 3.0 x 3.5 cm \par A left upper pole isoechoic lesion is noted measuring 1.3 x 1.0 cm, no vascular flow noted. Left lower pole echogenic parenchymal focus noted.\par Both kidneys are normal in size, position and echogenicity without hydronephrosis or stones  visualized.\par \par DMighty RDMS 07/20/2022\par

## 2023-06-12 ENCOUNTER — APPOINTMENT (OUTPATIENT)
Dept: UROLOGY | Facility: CLINIC | Age: 88
End: 2023-06-12
Payer: MEDICARE

## 2023-06-12 VITALS
SYSTOLIC BLOOD PRESSURE: 145 MMHG | WEIGHT: 168 LBS | RESPIRATION RATE: 16 BRPM | HEIGHT: 66 IN | OXYGEN SATURATION: 96 % | DIASTOLIC BLOOD PRESSURE: 65 MMHG | HEART RATE: 88 BPM | BODY MASS INDEX: 27 KG/M2

## 2023-06-12 PROCEDURE — 51741 ELECTRO-UROFLOWMETRY FIRST: CPT

## 2023-06-12 PROCEDURE — 51798 US URINE CAPACITY MEASURE: CPT

## 2023-06-12 PROCEDURE — 99214 OFFICE O/P EST MOD 30 MIN: CPT

## 2023-06-12 NOTE — LETTER BODY
[Dear  ___] : Dear  [unfilled], [Courtesy Letter:] : I had the pleasure of seeing your patient, [unfilled], in my office today. [Please see my note below.] : Please see my note below. [Consult Closing:] : Thank you very much for allowing me to participate in the care of this patient.  If you have any questions, please do not hesitate to contact me. [Sincerely,] : Sincerely, [DrDell  ___] : Dr. STRAUSS [FreeTextEntry3] : William

## 2023-06-12 NOTE — HISTORY OF PRESENT ILLNESS
[FreeTextEntry1] : The patient is without new clinical complaints. He is voiding adequately with nocturia x2-3; he denies dysuria, hematuria or flank pain.

## 2023-06-12 NOTE — PHYSICAL EXAM
[General Appearance - Well Developed] : well developed [General Appearance - Well Nourished] : well nourished [Normal Appearance] : normal appearance [Well Groomed] : well groomed [General Appearance - In No Acute Distress] : no acute distress [Abdomen Soft] : soft [Abdomen Tenderness] : non-tender [Costovertebral Angle Tenderness] : no ~M costovertebral angle tenderness [Urethral Meatus] : meatus normal [Penis Abnormality] : normal uncircumcised penis [Urinary Bladder Findings] : the bladder was normal on palpation [Scrotum] : the scrotum was normal [Testes Mass (___cm)] : there were no testicular masses [Edema] : no peripheral edema [] : no respiratory distress [Respiration, Rhythm And Depth] : normal respiratory rhythm and effort [Exaggerated Use Of Accessory Muscles For Inspiration] : no accessory muscle use [Affect] : the affect was normal [Oriented To Time, Place, And Person] : oriented to person, place, and time [Mood] : the mood was normal [Not Anxious] : not anxious [Normal Station and Gait] : the gait and station were normal for the patient's age [No Focal Deficits] : no focal deficits [No Palpable Adenopathy] : no palpable adenopathy [Epididymis] : the epididymides were normal [Testes Tenderness] : no tenderness of the testes

## 2023-06-12 NOTE — ADDENDUM
[FreeTextEntry1] : Next Vist: PVR, Uroflow, renal US\par IMaria Elena documented this note as a scribe on behalf of Dr. William Hyatt MD on 06/12/2023 \par \par The documentation recorded by the scribe accuracy reflects the service I personally preformed and the decisions made by me.\par \par

## 2023-06-12 NOTE — ASSESSMENT
[FreeTextEntry1] : The patient stable clinically with no evidence of recurrent urothelial carcinoma, stable BPH and a stable renal mass which we will continue treating conservatively.  He will continue with finasteride and tamsulosin. He will have a renal ultrasound and undergo surveillance cystoscopy in 6 months

## 2023-11-22 NOTE — PROGRESS NOTE ADULT - SUBJECTIVE AND OBJECTIVE BOX
AM Note    pt received 2 units pRBC's o/n. No adverse reaction, will f/u AM CBC      Vital Signs Last 24 Hrs  T(C): 36.5 (06-25-21 @ 05:11), Max: 36.8 (06-24-21 @ 20:30)  T(F): 97.7 (06-25-21 @ 05:11), Max: 98.2 (06-24-21 @ 20:30)  HR: 69 (06-25-21 @ 05:11) (67 - 86)  BP: 108/60 (06-25-21 @ 05:11) (108/60 - 164/77)  BP(mean): 76 (06-25-21 @ 05:11) (76 - 76)  RR: 18 (06-25-21 @ 05:11) (17 - 18)  SpO2: 95% (06-25-21 @ 05:11) (94% - 96%)     24 Jun 2021 14:06    135    |  100    |  19     ----------------------------<  105    4.1     |  24     |  0.95     Ca    9.0        24 Jun 2021 14:06    TPro  6.6    /  Alb  3.7    /  TBili  0.4    /  DBili  x      /  AST  15     /  ALT  12     /  AlkPhos  77     24 Jun 2021 14:06                          7.6    7.05  )-----------( 263      ( 24 Jun 2021 14:06 )             25.3         I&O's Summary    24 Jun 2021 07:01  -  25 Jun 2021 06:03  --------------------------------------------------------  IN: 1000 mL / OUT: 920 mL / NET: 80 mL          PHYSICAL EXAM:    GEN: awake and alert  ABD: nontender, nondistended  : no suprapubic/CVAT Detail Level: Simple Render Risk Assessment In Note?: no Additional Notes: Used Ln2

## 2023-12-13 ENCOUNTER — APPOINTMENT (OUTPATIENT)
Dept: UROLOGY | Facility: CLINIC | Age: 88
End: 2023-12-13
Payer: MEDICARE

## 2023-12-13 PROCEDURE — 76705 ECHO EXAM OF ABDOMEN: CPT

## 2023-12-13 PROCEDURE — 52000 CYSTOURETHROSCOPY: CPT

## 2023-12-13 PROCEDURE — 51798 US URINE CAPACITY MEASURE: CPT

## 2023-12-13 NOTE — ASSESSMENT
[FreeTextEntry1] : The patient is stable clinically with no evidence of recurrent urothelial carcinoma on cystoscopy today and a stable right renal mass. We will continue conservative management of his mass and combination pharmacotherapy for his BPH. We will hold off future cystoscopies unless new symptoms develop. He will come back in 6 months.

## 2024-04-30 NOTE — DISCHARGE NOTE PROVIDER - NSDCDCMDCOMP_GEN_ALL_CORE
This document is complete and the patient is ready for discharge.
This document is complete and the patient is ready for discharge.
Unable to assess

## 2024-06-12 ENCOUNTER — APPOINTMENT (OUTPATIENT)
Dept: UROLOGY | Facility: CLINIC | Age: 89
End: 2024-06-12

## 2024-06-12 VITALS
DIASTOLIC BLOOD PRESSURE: 70 MMHG | OXYGEN SATURATION: 97 % | HEIGHT: 66 IN | TEMPERATURE: 97.9 F | SYSTOLIC BLOOD PRESSURE: 159 MMHG | HEART RATE: 99 BPM | BODY MASS INDEX: 27 KG/M2 | WEIGHT: 168 LBS

## 2024-06-12 DIAGNOSIS — N28.9 DISORDER OF KIDNEY AND URETER, UNSPECIFIED: ICD-10-CM

## 2024-06-12 DIAGNOSIS — N40.1 BENIGN PROSTATIC HYPERPLASIA WITH LOWER URINARY TRACT SYMPMS: ICD-10-CM

## 2024-06-12 DIAGNOSIS — Z85.51 PERSONAL HISTORY OF MALIGNANT NEOPLASM OF BLADDER: ICD-10-CM

## 2024-06-12 PROCEDURE — 99212 OFFICE O/P EST SF 10 MIN: CPT | Mod: 25

## 2024-06-12 PROCEDURE — 76705 ECHO EXAM OF ABDOMEN: CPT

## 2024-06-12 PROCEDURE — 51798 US URINE CAPACITY MEASURE: CPT

## 2024-06-12 PROCEDURE — 51741 ELECTRO-UROFLOWMETRY FIRST: CPT

## 2024-06-12 RX ORDER — FINASTERIDE 5 MG/1
5 TABLET, FILM COATED ORAL DAILY
Qty: 90 | Refills: 3 | Status: ACTIVE | COMMUNITY
Start: 2021-07-20 | End: 1900-01-01

## 2024-06-12 RX ORDER — TAMSULOSIN HYDROCHLORIDE 0.4 MG/1
0.4 CAPSULE ORAL DAILY
Qty: 180 | Refills: 3 | Status: ACTIVE | COMMUNITY
Start: 2021-05-28 | End: 1900-01-01

## 2024-06-12 NOTE — REVIEW OF SYSTEMS
[Negative] : Heme/Lymph [Nocturia] : nocturia [Blood in urine that you can see] : blood visible in urine [Wake up at night to urinate  How many times?  ___] : wakes up to urinate [unfilled] times during the night [Loss of interest] : denies loss of interest in sexual activity [Urine Infection (bladder/kidney)] : denies bladder/kidney infections [Pain during urination] : denies pain during urination [Pain at onset of urination] : denies pain during onset of urination [Pain after urination] : denies pain after urination [Told you have blood in urine on a urine test] : denies being told that blood was present in a urine test [Discharge from urine canal] : denies discharge from urine canal [History of kidney stones] : denies history of kidney stones [Urine retention] : denies urine retention [Strong urge to urinate] : denies strong urge to urinate [Bladder pressure] : denies bladder pressure [Strain or push to urinate] : denies straining or pushing to urinate [Wait a long time to urinate] : denies waiting a long time to urinate [Slow urine stream] : denies slow urine stream [Interrupted urine stream] : denies interrupted urine stream [Bladder fullness after urinating] : denies bladder fullness after urinating [Increased pain/discomfort with bladder filling] : denies increased pain/discomfort with bladder filling [Bladder problems as child. If yes, describe..] : denies bladder problems as child [Leakage of urine with straining, coughing, laughing] : denies leakage of urine with straining, coughing, and/or laughing [Unaware of when urine is leaking] : aware of when urine is leaking

## 2024-06-12 NOTE — PHYSICAL EXAM
[Normal Appearance] : normal appearance [Well Groomed] : well groomed [General Appearance - In No Acute Distress] : no acute distress [Edema] : no peripheral edema [Respiration, Rhythm And Depth] : normal respiratory rhythm and effort [Exaggerated Use Of Accessory Muscles For Inspiration] : no accessory muscle use [Abdomen Soft] : soft [Abdomen Tenderness] : non-tender [Costovertebral Angle Tenderness] : no ~M costovertebral angle tenderness [Urinary Bladder Findings] : the bladder was normal on palpation [Normal Station and Gait] : the gait and station were normal for the patient's age [] : no rash [No Focal Deficits] : no focal deficits [Oriented To Time, Place, And Person] : oriented to person, place, and time [Mood] : the mood was normal [Affect] : the affect was normal [No Palpable Adenopathy] : no palpable adenopathy [Urethral Meatus] : meatus normal [Penis Abnormality] : normal uncircumcised penis [Scrotum] : the scrotum was normal

## 2024-06-12 NOTE — ASSESSMENT
[FreeTextEntry1] : Patient is stable clinically; he is voiding adequately with significant but stable post void residual. Renal ultrasound today revealed heterogenous mass in the right mid to upper pole with some vascularity to it, appears stable in size; parenchymal echogenic focus in the left lower pole measuring 7.5 mm with posterior shadowing; both kidneys are normal in size and echogenicity without hydronephrosis. We will continue conservative management of his mass and combination pharmacotherapy for his BPH. If all remains stable he will follow-up in 6 months.

## 2024-06-12 NOTE — HISTORY OF PRESENT ILLNESS
[FreeTextEntry1] : Patient comes in with no new voiding complaints. He is voiding adequately with nocturia x3-4; he denies dysuria, hematuria or flank pain. He drinks 2-3 cups of coffee a day. He continues to take tamsulosin and finasteride. Today he will have a renal ultrasound.

## 2024-06-12 NOTE — ADDENDUM
[FreeTextEntry1] : Next visit: PVR, Uroflow, Renal US  Entered by Negrita Khoury, acting as scribe for Dr. William Hyatt.   The documentation recorded by the scribe accurately reflects the service I personally performed and the decisions made by me.

## 2024-12-18 ENCOUNTER — APPOINTMENT (OUTPATIENT)
Dept: UROLOGY | Facility: CLINIC | Age: 88
End: 2024-12-18

## 2025-01-02 NOTE — ASU PREOP CHECKLIST - HAND OFF
"  Physical Therapy  Physical Therapy Treatment Note/Re-Check     Patient Name: Dayanara Nelson  MRN: [id number]  Today's Date: 1/2/2025  Time Calculation  Start Time: 0955  Stop Time: 1105  Time Calculation (min): 70 min    Insurance:  Visit number: 10 of 59 (9 used last year)   Authorization info: NAN   Insurance Type: [place]     General:  Reason for visit: Right ankle arthroscopy with extensive debridement and syndesmotic stabilization from 10/31/2024.    Referred by: Dr. Cochran  [employer]: [employer]  Sport: ice hockey   POW: 8 weeks   Current Problem  1. Acute right ankle pain  Follow Up In Physical Therapy      2. Muscle weakness [M62.81]        3. Orthopedic aftercare              Precautions: Hx two L knee surgeries (2023, 2024). NWB R LE for 6 weeks, no inversion/eversion until 10 weeks, AROM DF/PF beginning 4-6 weeks       Subjective:     Dayanara reports she has noticed some muscle twitching in the R lateral ankle which is making her feel the plate more. She has had some soreness since therapy and being up on her feet a lot new years nuria.       Pain   4/10 entering clinic     Performing HEP?: Yes      Objective:   Patient arrives PWB using brace and crutches to ambulate.   Figure 8: R 44 cm  L 43.5 cm     PROM R ankle (previous session)   DF: +2   PF: 55 deg  Inversion: 25 deg  Eversion to just past neutral     Treatment Performed:    Therapeutic Exercise:    35 min  Weight shifts forward and lateral 10 x 10\"  Banded ankle DF/PF RTB 3 x 8-10 -with BFR today   BFR 70% LOP 30, 15,15,15  Ankle invr iso ball  Ankle PF/DF (30 each)   S/l hip abduction  Seated calf raises 3 x 8 with 10 # wt on knee   Rocker board AP/ML 20 x seated   LAQ 5 # 3 x 10 5\" hold   DL squat to table 3 x 8 -NT  NWB gastroc and soleus stretch 3 x 30\"   Standing ankle DF stretching with R LE posterior 3  x 30 \" -NT  Gait training   5 minutes -NT  Alter G 60% BW 1 min 5 x     NT  BFR 70% LOP 30, 15,15,15  Seated calf " "raises  DF isometrics-NT  Inversion isometrics with ball   Ankle isometrics 4 way 2 x 10 5\" hold -Hep today      Manual Therapy:    20 min  STM to R achilles, calf, tibialis anterior, plantar fascia  Passive toe stretching flexion and extension   Scar massage   PROM (caution inversion/eversion and ER with DF)     Neuromuscular Re-education:  0 min      Other:     10 min not billable  Vaso x15 minutes, lite pressure, 34 degrees     PT Therapeutic Procedures Time Entry  Manual Therapy Time Entry: 20  Therapeutic Exercise Time Entry: 35              Non-Billable Time  Non-billable time: 10  Non-billable time reason: ice   Assessment:   Decreased weight bearing exercises today using Alter G due to increased soreness in the ankle today. Pt symptoms described are increased nerve conduction to the surgical area and muscle activation. Pt continues to have pain when attempting to place full weight through her surgical limb. Continued with weight shifts to her tolerance but did not push through sharp pain. Pt eager to get off crutches- discussed goals to be able to do this. No increased joint effusion noted today R ankle.       Plan:  Continue with gradual weight progression and weaning from crutches. Continue progressing exercise.       Gracia Avitia, PT      " yes